# Patient Record
Sex: FEMALE | Race: WHITE | Employment: FULL TIME | URBAN - METROPOLITAN AREA
[De-identification: names, ages, dates, MRNs, and addresses within clinical notes are randomized per-mention and may not be internally consistent; named-entity substitution may affect disease eponyms.]

---

## 2017-10-11 ENCOUNTER — OFFICE VISIT - HEALTHEAST (OUTPATIENT)
Dept: FAMILY MEDICINE | Facility: CLINIC | Age: 52
End: 2017-10-11

## 2017-10-11 DIAGNOSIS — Z12.11 SCREENING FOR COLON CANCER: ICD-10-CM

## 2017-10-11 DIAGNOSIS — Z00.00 ROUTINE GENERAL MEDICAL EXAMINATION AT A HEALTH CARE FACILITY: ICD-10-CM

## 2017-10-11 DIAGNOSIS — M79.89 FINGER SWELLING: ICD-10-CM

## 2017-10-11 DIAGNOSIS — G25.0 ESSENTIAL TREMOR: ICD-10-CM

## 2017-10-11 LAB
CHOLEST SERPL-MCNC: 334 MG/DL
FASTING STATUS PATIENT QL REPORTED: ABNORMAL
HDLC SERPL-MCNC: 89 MG/DL
LDLC SERPL CALC-MCNC: 232 MG/DL
TRIGL SERPL-MCNC: 65 MG/DL

## 2017-10-11 ASSESSMENT — MIFFLIN-ST. JEOR: SCORE: 1061.38

## 2017-10-12 LAB — ANA SER QL: 3.8 U

## 2017-10-17 LAB — DNA (DS) ANTIBODY - HISTORICAL: 1 IU

## 2017-10-20 ENCOUNTER — AMBULATORY - HEALTHEAST (OUTPATIENT)
Dept: FAMILY MEDICINE | Facility: CLINIC | Age: 52
End: 2017-10-20

## 2017-10-24 ENCOUNTER — COMMUNICATION - HEALTHEAST (OUTPATIENT)
Dept: FAMILY MEDICINE | Facility: CLINIC | Age: 52
End: 2017-10-24

## 2017-10-24 DIAGNOSIS — E78.5 HYPERLIPIDEMIA: ICD-10-CM

## 2017-12-20 ENCOUNTER — OFFICE VISIT - HEALTHEAST (OUTPATIENT)
Dept: FAMILY MEDICINE | Facility: CLINIC | Age: 52
End: 2017-12-20

## 2017-12-20 DIAGNOSIS — Z12.4 PAP SMEAR FOR CERVICAL CANCER SCREENING: ICD-10-CM

## 2017-12-20 DIAGNOSIS — E78.00 HIGH CHOLESTEROL: ICD-10-CM

## 2017-12-20 ASSESSMENT — MIFFLIN-ST. JEOR: SCORE: 1061.38

## 2017-12-22 LAB
HUMAN PAPILLOMA VIRUS 16 DNA: NEGATIVE
HUMAN PAPILLOMA VIRUS 18 DNA: NEGATIVE
HUMAN PAPILLOMA VIRUS FINAL DIAGNOSIS: NORMAL
HUMAN PAPILLOMA VIRUS OTHER HR: NEGATIVE
SPECIMEN DESCRIPTION: NORMAL

## 2017-12-26 ENCOUNTER — COMMUNICATION - HEALTHEAST (OUTPATIENT)
Dept: FAMILY MEDICINE | Facility: CLINIC | Age: 52
End: 2017-12-26

## 2017-12-26 LAB
BKR LAB AP ABNORMAL BLEEDING: NO
BKR LAB AP BIRTH CONTROL/HORMONES: NORMAL
BKR LAB AP CERVICAL APPEARANCE: NORMAL
BKR LAB AP GYN ADEQUACY: NORMAL
BKR LAB AP GYN INTERPRETATION: NORMAL
BKR LAB AP HPV REFLEX: NORMAL
BKR LAB AP LMP: NORMAL
BKR LAB AP PATIENT STATUS: NORMAL
BKR LAB AP PREVIOUS ABNORMAL: NORMAL
BKR LAB AP PREVIOUS NORMAL: 2014
HIGH RISK?: NO
PATH REPORT.COMMENTS IMP SPEC: NORMAL
RESULT FLAG (HE HISTORICAL CONVERSION): NORMAL

## 2018-05-08 ENCOUNTER — COMMUNICATION - HEALTHEAST (OUTPATIENT)
Dept: FAMILY MEDICINE | Facility: CLINIC | Age: 53
End: 2018-05-08

## 2018-10-12 ENCOUNTER — AMBULATORY - HEALTHEAST (OUTPATIENT)
Dept: NURSING | Facility: CLINIC | Age: 53
End: 2018-10-12

## 2018-10-12 DIAGNOSIS — Z23 NEED FOR IMMUNIZATION AGAINST INFLUENZA: ICD-10-CM

## 2019-01-15 ENCOUNTER — OFFICE VISIT - HEALTHEAST (OUTPATIENT)
Dept: FAMILY MEDICINE | Facility: CLINIC | Age: 54
End: 2019-01-15

## 2019-01-15 DIAGNOSIS — Z00.00 ROUTINE GENERAL MEDICAL EXAMINATION AT A HEALTH CARE FACILITY: ICD-10-CM

## 2019-01-15 DIAGNOSIS — H61.23 EXCESSIVE CERUMEN IN BOTH EAR CANALS: ICD-10-CM

## 2019-01-15 DIAGNOSIS — D75.89 MACROCYTOSIS: ICD-10-CM

## 2019-01-15 DIAGNOSIS — E78.5 HYPERLIPIDEMIA LDL GOAL <160: ICD-10-CM

## 2019-01-15 DIAGNOSIS — Z12.31 VISIT FOR SCREENING MAMMOGRAM: ICD-10-CM

## 2019-01-15 LAB
BASOPHILS # BLD AUTO: 0 THOU/UL (ref 0–0.2)
BASOPHILS NFR BLD AUTO: 1 % (ref 0–2)
CHOLEST SERPL-MCNC: 292 MG/DL
EOSINOPHIL # BLD AUTO: 0.1 THOU/UL (ref 0–0.4)
EOSINOPHIL NFR BLD AUTO: 2 % (ref 0–6)
ERYTHROCYTE [DISTWIDTH] IN BLOOD BY AUTOMATED COUNT: 11.9 % (ref 11–14.5)
FASTING STATUS PATIENT QL REPORTED: YES
HCT VFR BLD AUTO: 37.4 % (ref 35–47)
HDLC SERPL-MCNC: 105 MG/DL
HGB BLD-MCNC: 12.7 G/DL (ref 12–16)
LDLC SERPL CALC-MCNC: 177 MG/DL
LYMPHOCYTES # BLD AUTO: 1.3 THOU/UL (ref 0.8–4.4)
LYMPHOCYTES NFR BLD AUTO: 27 % (ref 20–40)
MCH RBC QN AUTO: 33.6 PG (ref 27–34)
MCHC RBC AUTO-ENTMCNC: 33.8 G/DL (ref 32–36)
MCV RBC AUTO: 99 FL (ref 80–100)
MONOCYTES # BLD AUTO: 0.6 THOU/UL (ref 0–0.9)
MONOCYTES NFR BLD AUTO: 12 % (ref 2–10)
NEUTROPHILS # BLD AUTO: 2.8 THOU/UL (ref 2–7.7)
NEUTROPHILS NFR BLD AUTO: 59 % (ref 50–70)
PLATELET # BLD AUTO: 252 THOU/UL (ref 140–440)
PMV BLD AUTO: 8.1 FL (ref 7–10)
RBC # BLD AUTO: 3.77 MILL/UL (ref 3.8–5.4)
TRIGL SERPL-MCNC: 48 MG/DL
VIT B12 SERPL-MCNC: >2000 PG/ML (ref 213–816)
WBC: 4.7 THOU/UL (ref 4–11)

## 2019-01-15 RX ORDER — ESTRADIOL 0.1 MG/D
1 PATCH TRANSDERMAL WEEKLY
Qty: 12 PATCH | Refills: 4 | Status: SHIPPED | OUTPATIENT
Start: 2019-01-15 | End: 2021-08-27

## 2019-01-15 RX ORDER — ALENDRONATE SODIUM 70 MG/1
70 TABLET ORAL
Qty: 12 TABLET | Refills: 4 | Status: SHIPPED | OUTPATIENT
Start: 2019-01-15 | End: 2022-05-31 | Stop reason: ALTCHOICE

## 2019-01-15 RX ORDER — OMEGA-3-ACID ETHYL ESTERS 1 G/1
2 CAPSULE, LIQUID FILLED ORAL
Status: SHIPPED | COMMUNITY
Start: 2011-05-20 | End: 2022-12-14

## 2019-01-15 ASSESSMENT — MIFFLIN-ST. JEOR: SCORE: 1055.04

## 2019-01-25 ENCOUNTER — RECORDS - HEALTHEAST (OUTPATIENT)
Dept: ADMINISTRATIVE | Facility: OTHER | Age: 54
End: 2019-01-25

## 2019-02-15 ENCOUNTER — RECORDS - HEALTHEAST (OUTPATIENT)
Dept: ADMINISTRATIVE | Facility: OTHER | Age: 54
End: 2019-02-15

## 2019-02-26 ENCOUNTER — APPOINTMENT (OUTPATIENT)
Dept: NURSING | Facility: CLINIC | Age: 54
End: 2019-02-26
Payer: COMMERCIAL

## 2019-04-28 ENCOUNTER — COMMUNICATION - HEALTHEAST (OUTPATIENT)
Dept: FAMILY MEDICINE | Facility: CLINIC | Age: 54
End: 2019-04-28

## 2019-07-25 ENCOUNTER — RECORDS - HEALTHEAST (OUTPATIENT)
Dept: ADMINISTRATIVE | Facility: OTHER | Age: 54
End: 2019-07-25

## 2019-09-24 ENCOUNTER — COMMUNICATION - HEALTHEAST (OUTPATIENT)
Dept: FAMILY MEDICINE | Facility: CLINIC | Age: 54
End: 2019-09-24

## 2019-10-01 ENCOUNTER — OFFICE VISIT - HEALTHEAST (OUTPATIENT)
Dept: FAMILY MEDICINE | Facility: CLINIC | Age: 54
End: 2019-10-01

## 2019-10-01 DIAGNOSIS — R52 PAIN: ICD-10-CM

## 2019-10-01 DIAGNOSIS — Z23 NEED FOR IMMUNIZATION AGAINST INFLUENZA: ICD-10-CM

## 2019-10-01 RX ORDER — GABAPENTIN 300 MG/1
300-900 CAPSULE ORAL
Status: SHIPPED | COMMUNITY
Start: 2019-09-11 | End: 2021-08-27 | Stop reason: ALTCHOICE

## 2019-10-01 RX ORDER — OMEGA-3-ACID ETHYL ESTERS 1 G/1
2 CAPSULE, LIQUID FILLED ORAL
Status: SHIPPED | COMMUNITY
Start: 2011-05-20 | End: 2022-05-31

## 2019-10-01 RX ORDER — GABAPENTIN 300 MG/1
CAPSULE ORAL
Refills: 0 | Status: SHIPPED | COMMUNITY
Start: 2019-09-11 | End: 2021-08-27 | Stop reason: ALTCHOICE

## 2019-10-01 ASSESSMENT — PATIENT HEALTH QUESTIONNAIRE - PHQ9: SUM OF ALL RESPONSES TO PHQ QUESTIONS 1-9: 2

## 2019-10-01 ASSESSMENT — MIFFLIN-ST. JEOR: SCORE: 1064.21

## 2019-10-02 ENCOUNTER — COMMUNICATION - HEALTHEAST (OUTPATIENT)
Dept: FAMILY MEDICINE | Facility: CLINIC | Age: 54
End: 2019-10-02

## 2019-10-02 DIAGNOSIS — R52 PAIN: ICD-10-CM

## 2019-10-02 DIAGNOSIS — F32.4 MAJOR DEPRESSIVE DISORDER WITH SINGLE EPISODE, IN PARTIAL REMISSION (H): ICD-10-CM

## 2019-10-02 DIAGNOSIS — M85.80 OSTEOPENIA, UNSPECIFIED LOCATION: ICD-10-CM

## 2019-10-02 DIAGNOSIS — Z78.0 MENOPAUSE: ICD-10-CM

## 2019-10-14 ENCOUNTER — COMMUNICATION - HEALTHEAST (OUTPATIENT)
Dept: FAMILY MEDICINE | Facility: CLINIC | Age: 54
End: 2019-10-14

## 2019-10-14 DIAGNOSIS — Z00.00 ROUTINE HISTORY AND PHYSICAL EXAMINATION OF ADULT: ICD-10-CM

## 2019-10-27 ENCOUNTER — COMMUNICATION - HEALTHEAST (OUTPATIENT)
Dept: FAMILY MEDICINE | Facility: CLINIC | Age: 54
End: 2019-10-27

## 2019-10-27 DIAGNOSIS — M54.2 NECK PAIN: ICD-10-CM

## 2019-10-27 DIAGNOSIS — M47.812 ARTHROPATHY OF CERVICAL FACET JOINT: ICD-10-CM

## 2019-10-28 ENCOUNTER — COMMUNICATION - HEALTHEAST (OUTPATIENT)
Dept: FAMILY MEDICINE | Facility: CLINIC | Age: 54
End: 2019-10-28

## 2019-10-28 DIAGNOSIS — Z00.00 ROUTINE ADULT HEALTH MAINTENANCE: ICD-10-CM

## 2019-10-28 DIAGNOSIS — Z23 NEED FOR DIPHTHERIA, TETANUS, ACELLULAR PERTUSSIS, HAEMOPHILUS INFLUENZAE, AND HEPATITIS B VIRUS VACCINE: ICD-10-CM

## 2019-11-12 ENCOUNTER — COMMUNICATION - HEALTHEAST (OUTPATIENT)
Dept: FAMILY MEDICINE | Facility: CLINIC | Age: 54
End: 2019-11-12

## 2019-11-12 DIAGNOSIS — M54.2 NECK PAIN: ICD-10-CM

## 2019-11-12 DIAGNOSIS — M47.812 ARTHROPATHY OF CERVICAL FACET JOINT: ICD-10-CM

## 2019-11-19 ENCOUNTER — RECORDS - HEALTHEAST (OUTPATIENT)
Dept: ADMINISTRATIVE | Facility: OTHER | Age: 54
End: 2019-11-19

## 2019-11-20 ENCOUNTER — RECORDS - HEALTHEAST (OUTPATIENT)
Dept: ADMINISTRATIVE | Facility: OTHER | Age: 54
End: 2019-11-20

## 2019-11-25 ENCOUNTER — RECORDS - HEALTHEAST (OUTPATIENT)
Dept: ADMINISTRATIVE | Facility: OTHER | Age: 54
End: 2019-11-25

## 2019-12-10 ENCOUNTER — COMMUNICATION - HEALTHEAST (OUTPATIENT)
Dept: FAMILY MEDICINE | Facility: CLINIC | Age: 54
End: 2019-12-10

## 2019-12-10 DIAGNOSIS — M47.812 ARTHROPATHY OF CERVICAL FACET JOINT: ICD-10-CM

## 2019-12-10 DIAGNOSIS — M54.9 PAIN, UPPER BACK: ICD-10-CM

## 2019-12-10 DIAGNOSIS — M54.2 NECK PAIN: ICD-10-CM

## 2020-01-19 ENCOUNTER — RECORDS - HEALTHEAST (OUTPATIENT)
Dept: ADMINISTRATIVE | Facility: OTHER | Age: 55
End: 2020-01-19

## 2020-01-29 ENCOUNTER — COMMUNICATION - HEALTHEAST (OUTPATIENT)
Dept: FAMILY MEDICINE | Facility: CLINIC | Age: 55
End: 2020-01-29

## 2020-01-31 ENCOUNTER — OFFICE VISIT - HEALTHEAST (OUTPATIENT)
Dept: FAMILY MEDICINE | Facility: CLINIC | Age: 55
End: 2020-01-31

## 2020-01-31 ENCOUNTER — COMMUNICATION - HEALTHEAST (OUTPATIENT)
Dept: FAMILY MEDICINE | Facility: CLINIC | Age: 55
End: 2020-01-31

## 2020-01-31 DIAGNOSIS — E78.5 HYPERLIPIDEMIA LDL GOAL <130: ICD-10-CM

## 2020-01-31 DIAGNOSIS — E55.9 VITAMIN D DEFICIENCY DISEASE: ICD-10-CM

## 2020-01-31 DIAGNOSIS — M85.80 OSTEOPENIA, UNSPECIFIED LOCATION: ICD-10-CM

## 2020-01-31 DIAGNOSIS — Z78.0 MENOPAUSE: ICD-10-CM

## 2020-01-31 DIAGNOSIS — F32.2 CURRENT SEVERE EPISODE OF MAJOR DEPRESSIVE DISORDER WITHOUT PSYCHOTIC FEATURES WITHOUT PRIOR EPISODE (H): ICD-10-CM

## 2020-01-31 DIAGNOSIS — Z00.00 ROUTINE HISTORY AND PHYSICAL EXAMINATION OF ADULT: ICD-10-CM

## 2020-01-31 DIAGNOSIS — F41.1 GENERALIZED ANXIETY DISORDER: ICD-10-CM

## 2020-01-31 DIAGNOSIS — M54.2 NECK PAIN: ICD-10-CM

## 2020-01-31 LAB
CHOLEST SERPL-MCNC: 399 MG/DL
FASTING STATUS PATIENT QL REPORTED: NO
HDLC SERPL-MCNC: 102 MG/DL
LDLC SERPL CALC-MCNC: 284 MG/DL
TRIGL SERPL-MCNC: 66 MG/DL

## 2020-01-31 RX ORDER — BUSPIRONE HYDROCHLORIDE 5 MG/1
5 TABLET ORAL 3 TIMES DAILY
Qty: 90 TABLET | Refills: 2 | Status: SHIPPED | OUTPATIENT
Start: 2020-01-31 | End: 2021-10-12

## 2020-01-31 RX ORDER — ESTRADIOL 1 MG/1
1 TABLET ORAL DAILY
Qty: 90 TABLET | Refills: 6 | Status: SHIPPED | OUTPATIENT
Start: 2020-01-31 | End: 2021-08-27

## 2020-01-31 ASSESSMENT — MIFFLIN-ST. JEOR: SCORE: 1054.76

## 2020-02-02 ENCOUNTER — COMMUNICATION - HEALTHEAST (OUTPATIENT)
Dept: FAMILY MEDICINE | Facility: CLINIC | Age: 55
End: 2020-02-02

## 2020-02-03 LAB
25(OH)D3 SERPL-MCNC: 36.7 NG/ML (ref 30–80)
25(OH)D3 SERPL-MCNC: 36.7 NG/ML (ref 30–80)

## 2020-03-10 ENCOUNTER — COMMUNICATION - HEALTHEAST (OUTPATIENT)
Dept: FAMILY MEDICINE | Facility: CLINIC | Age: 55
End: 2020-03-10

## 2020-03-30 ENCOUNTER — COMMUNICATION - HEALTHEAST (OUTPATIENT)
Dept: ADMINISTRATIVE | Facility: CLINIC | Age: 55
End: 2020-03-30

## 2020-12-11 ENCOUNTER — COMMUNICATION - HEALTHEAST (OUTPATIENT)
Dept: FAMILY MEDICINE | Facility: CLINIC | Age: 55
End: 2020-12-11

## 2020-12-11 DIAGNOSIS — E78.5 HYPERLIPIDEMIA LDL GOAL <130: ICD-10-CM

## 2020-12-14 ENCOUNTER — COMMUNICATION - HEALTHEAST (OUTPATIENT)
Dept: FAMILY MEDICINE | Facility: CLINIC | Age: 55
End: 2020-12-14

## 2020-12-15 ENCOUNTER — COMMUNICATION - HEALTHEAST (OUTPATIENT)
Dept: FAMILY MEDICINE | Facility: CLINIC | Age: 55
End: 2020-12-15

## 2020-12-15 ENCOUNTER — AMBULATORY - HEALTHEAST (OUTPATIENT)
Dept: LAB | Facility: CLINIC | Age: 55
End: 2020-12-15

## 2020-12-15 DIAGNOSIS — E78.5 HYPERLIPIDEMIA LDL GOAL <130: ICD-10-CM

## 2020-12-15 LAB
CHOLEST SERPL-MCNC: 381 MG/DL
FASTING STATUS PATIENT QL REPORTED: YES
HDLC SERPL-MCNC: 99 MG/DL
LDLC SERPL CALC-MCNC: 264 MG/DL
TRIGL SERPL-MCNC: 90 MG/DL

## 2021-03-24 ENCOUNTER — AMBULATORY - HEALTHEAST (OUTPATIENT)
Dept: FAMILY MEDICINE | Facility: CLINIC | Age: 56
End: 2021-03-24

## 2021-03-24 ENCOUNTER — MEDICAL CORRESPONDENCE (OUTPATIENT)
Dept: HEALTH INFORMATION MANAGEMENT | Facility: CLINIC | Age: 56
End: 2021-03-24

## 2021-03-24 ENCOUNTER — COMMUNICATION - HEALTHEAST (OUTPATIENT)
Dept: FAMILY MEDICINE | Facility: CLINIC | Age: 56
End: 2021-03-24

## 2021-03-24 DIAGNOSIS — E78.5 HYPERLIPIDEMIA LDL GOAL <130: ICD-10-CM

## 2021-03-26 DIAGNOSIS — E78.5 HYPERLIPIDEMIA LDL GOAL <130: Primary | ICD-10-CM

## 2021-04-24 DIAGNOSIS — E78.5 HYPERLIPIDEMIA LDL GOAL <100: ICD-10-CM

## 2021-04-24 LAB
CHOLEST SERPL-MCNC: 320 MG/DL
HDLC SERPL-MCNC: 128 MG/DL
LDLC SERPL CALC-MCNC: 181 MG/DL
NONHDLC SERPL-MCNC: 192 MG/DL
TRIGL SERPL-MCNC: 54 MG/DL

## 2021-04-24 PROCEDURE — 36415 COLL VENOUS BLD VENIPUNCTURE: CPT | Performed by: FAMILY MEDICINE

## 2021-04-24 PROCEDURE — 80061 LIPID PANEL: CPT | Performed by: FAMILY MEDICINE

## 2021-04-25 ENCOUNTER — COMMUNICATION - HEALTHEAST (OUTPATIENT)
Dept: FAMILY MEDICINE | Facility: CLINIC | Age: 56
End: 2021-04-25

## 2021-04-25 DIAGNOSIS — E78.5 HYPERLIPIDEMIA LDL GOAL <130: ICD-10-CM

## 2021-05-05 ENCOUNTER — COMMUNICATION - HEALTHEAST (OUTPATIENT)
Dept: FAMILY MEDICINE | Facility: CLINIC | Age: 56
End: 2021-05-05

## 2021-05-05 DIAGNOSIS — Z13.220 SCREENING FOR HYPERLIPIDEMIA: ICD-10-CM

## 2021-05-05 DIAGNOSIS — E78.5 HYPERLIPIDEMIA LDL GOAL <130: ICD-10-CM

## 2021-05-16 ENCOUNTER — HEALTH MAINTENANCE LETTER (OUTPATIENT)
Age: 56
End: 2021-05-16

## 2021-05-26 ASSESSMENT — PATIENT HEALTH QUESTIONNAIRE - PHQ9: SUM OF ALL RESPONSES TO PHQ QUESTIONS 1-9: 2

## 2021-05-31 VITALS — BODY MASS INDEX: 18.27 KG/M2 | HEIGHT: 64 IN | WEIGHT: 107 LBS

## 2021-05-31 VITALS — HEIGHT: 64 IN | BODY MASS INDEX: 18.27 KG/M2 | WEIGHT: 107 LBS

## 2021-06-01 NOTE — PROGRESS NOTES
OFFICE VISIT NOTE      Assessment & Plan   Lydia Alonzo is a 54 y.o. female with chronic neck pain likely due to her work positioning and a cervical facet hypertrophy.  She has already done a lot of PT formally and on her own, plus other evaluations. She wants to try a medication besides gabapentin to help this, and has chosen Savella. She'll do the intro dosing and remain in touch on the effect.  If this does not work, she might try Lyrica. She has tried AM dosing of gabapentin but found it too sedating. She took 300mg of gabapentin, however, and I think she could try 100mg AM and noon. Will follow.    Diagnoses and all orders for this visit:    Need for immunization against influenza  -     Influenza, Seasonal Quad, PF =/> 6months    Pain  -     milnacipran (SAVELLA) 12.5 mg (5)-25 mg(8)-50 mg(42) DsPk; Take as directed  Dispense: 45 each; Refill: 0        Ritika Torres MD    The following low BMI interventions were performed this visit: weight gain advised          Subjective:   Chief Complaint:  Follow-up (Neck Pain)    54 y.o. female.     1) neck pain has been pretty bad x 9 months. She's had evaluation by PT and others - she was found to have a cervical facet hypertrophy. She's been working at managing this pain and is quite frustrated by it. Her work demands 12 hr days and she simply cannot tolerate that. She has an ergonomic set up and stretches morning and night. She currently takes gabapentin 900mg at bedtime which helps her sleep. While she was told to take a morning dose and she tried it, she found it too sedating to maintain with work.    She's struggling with how much this is affecting her. She's not taken a week off work in a very long time - but she took the past week off, hoping to help her neck pain.    Current Outpatient Medications   Medication Sig     alendronate (FOSAMAX) 70 MG tablet Take 1 tablet (70 mg total) by mouth every 7 days.     estradiol (ESTRACE) 1 MG tablet      gabapentin  "(NEURONTIN) 300 MG capsule Take 300-900 mg by mouth.     gabapentin (NEURONTIN) 300 MG capsule      omega-3 acid ethyl esters (LOVAZA) 1 gram capsule Take 2 tablets by mouth.     omega-3 acid ethyl esters (LOVAZA) 1 gram capsule Take 2 tablets by mouth.     progesterone (PROMETRIUM) 100 MG capsule Take 1 capsule (100 mg total) by mouth daily.     progesterone (PROMETRIUM) 100 MG capsule Take 100 mg by mouth.     sulfacetamide-prednisoLONE (VASOCIDIN) 10 %-0.23 % (0.25 %) ophthalmic solution      estradiol (CLIMARA) 0.1 mg/24 hr Place 1 patch on the skin once a week.     ESTRADIOL TRANSDERMAL PATCH TD Place on the skin.     milnacipran (SAVELLA) 12.5 mg (5)-25 mg(8)-50 mg(42) DsPk Take as directed       PSFHx: appropriate sections of PMH completed/filled in   Tobacco Status:  She  reports that she has never smoked. She has never used smokeless tobacco.    Review of Systems:  No fever.  No rash. All other systems negative except as noted above.    Objective:    BP 90/56 (Patient Site: Left Arm, Patient Position: Sitting, Cuff Size: Adult Regular)   Pulse 60   Temp 98  F (36.7  C) (Oral)   Resp 16   Ht 5' 4\" (1.626 m)   Wt 106 lb 12 oz (48.4 kg)   BMI 18.32 kg/m    GENERAL: No acute distress, thin, frustrated  Neck: appears symmetric, no bruising, erythema, with palpation the worst pain is lateral to about C7 and the muscle tension is strong there; the rest of the upper trapezius has normal muscle tone; she has a definite head forward position/posture, which is not reversible.    Spent 40 min face to face with patient with more the 50% spent in counseling, education and coordination of care and discussing pain management, neck pain, attitude/frustration/mood.    "

## 2021-06-01 NOTE — TELEPHONE ENCOUNTER
Medication Request  Medication name:   estradiol (ESTRACE) 1 MG tablet  2 8/22/2019     Class: Historical Med      Pharmacy Name and Location:   [facility]  [address] [place] [address]  [phone]  Reason for request:   Patient request for Historical Medication  When did you use medication last?:    Unknown  Patient offered appointment:  No, patient saw Provider on 10/1/19  Okay to leave a detailed message: yes

## 2021-06-01 NOTE — TELEPHONE ENCOUNTER
Called [facility] pharmacy about Savella ordered yesterday, however, pharmacy is closed until 10 am.  Will call back. Thanks.

## 2021-06-01 NOTE — TELEPHONE ENCOUNTER
Medication Question or Clarification  Who is calling: Patient  What medication are you calling about? (include dose and sig)   milnacipran (SAVELLA) 12.5 mg (5)-25 mg(8)-50 mg(42) DsPk 45 each 0 10/1/2019     Sig: Take as directed    Sent to pharmacy as: milnacipran 12.5 mg (5)-25 mg(8)-50mg(42) tablets in a dose pack (SAVELLA)    E-Prescribing Status: Receipt confirmed by pharmacy (10/1/2019  8:00 AM CDT)      progesterone (PROMETRIUM) 100 MG capsule 90 capsule 4 1/15/2019     Sig - Route: Take 1 capsule (100 mg total) by mouth daily. - Oral    Sent to pharmacy as: progesterone (PROMETRIUM) 100 MG capsule    E-Prescribing Status: Receipt confirmed by pharmacy (1/15/2019  8:11 AM CST)        Who prescribed the medication?:   Ritika Torres MD  What is your question/concern?:   Patient's insurance does not cover above prescriptions at [facility].  Please send to new Pharmacy.  Pharmacy:   [facility]  [address]   [phone]  Okay to leave a detailed message?: Yes  Site CMT - Please call the pharmacy to obtain any additional needed information.

## 2021-06-02 VITALS — HEIGHT: 64 IN | BODY MASS INDEX: 17.78 KG/M2 | WEIGHT: 104.12 LBS

## 2021-06-02 NOTE — TELEPHONE ENCOUNTER
Please call [facility] in [place] ([phone]). This patient wishes to get her Hep A vaccine through them. I initially ordered it to be given at [facility], but now she clarified about getting it at that pharmacy. So, Monday evening I attempted to order it to the pharmacy. I want to be sure #1 they can give the Hep A shot and #2 they received the order. If they didn't, ask them the best way for me to order the shot (both the initial shot and the 2nd one in 6 months).

## 2021-06-02 NOTE — TELEPHONE ENCOUNTER
KAREN called the pharmacy in [place].     [facility] will print order for Hep A vaccine as ordered by PCP and faxed to [facility] at [phone] as requested.     Patient updated/notified. Thanks.

## 2021-06-02 NOTE — TELEPHONE ENCOUNTER
Assessment & Plan   Lydia Alonzo is a 54 y.o. female with chronic neck pain likely due to her work positioning and a cervical facet hypertrophy.  She has already done a lot of PT formally and on her own, plus other evaluations. She wants to try a medication besides gabapentin to help this, and has chosen Savella. She'll do the intro dosing and remain in touch on the effect.  If this does not work, she might try Lyrica. She has tried AM dosing of gabapentin but found it too sedating. She took 300mg of gabapentin, however, and I think she could try 100mg AM and noon. Will follow.     Diagnoses and all orders for this visit:     Need for immunization against influenza  -     Influenza, Seasonal Quad, PF =/> 6months     Pain  -     milnacipran (SAVELLA) 12.5 mg (5)-25 mg(8)-50 mg(42) DsPk; Take as directed  Dispense: 45 each; Refill: 0

## 2021-06-03 VITALS
TEMPERATURE: 98 F | WEIGHT: 106.75 LBS | DIASTOLIC BLOOD PRESSURE: 56 MMHG | HEIGHT: 64 IN | RESPIRATION RATE: 16 BRPM | HEART RATE: 60 BPM | BODY MASS INDEX: 18.22 KG/M2 | SYSTOLIC BLOOD PRESSURE: 90 MMHG

## 2021-06-04 VITALS
DIASTOLIC BLOOD PRESSURE: 62 MMHG | TEMPERATURE: 97.7 F | OXYGEN SATURATION: 99 % | HEIGHT: 64 IN | BODY MASS INDEX: 17.88 KG/M2 | HEART RATE: 57 BPM | SYSTOLIC BLOOD PRESSURE: 98 MMHG | WEIGHT: 104.75 LBS | RESPIRATION RATE: 18 BRPM

## 2021-06-05 NOTE — PROGRESS NOTES
Assessment:      Healthy female exam.    Hyperlipidemia - check today to see where it is trending  Vit d - check level due to osteopenia and emotional stress  Underweight - she might explore this more with a body composition analysis; to find out if she's overfat or underlean.  Neck pain - being managed by PM&R; she might explore massage and acupuncture.     Plan:       Discussed healthy lifestyle modifications.  discussed stress management and acceptance of troubles/problems/needs             Subjective:      Lydia Alonzo is a 54 y.o. female who presents for an annual exam. The patient is not sexually active. The patient participates in regular exercise: yes. The patient reports that there is not domestic violence in her life. She is struggling mood-wise given her neck pain problem and recent over-dose with botox which she has to wait to resolve. In addition, she had a recent exposure to tissue while making biopsy cuts (pathology specimen exposure) - this was deemed to be a low-risk exposure and she has taken no medication for it. Still, because of this, she prefers to have a tetanus update.    She admits she struggles to accept that she's having troubles. She feels like pain and problems are OK for others but not for her.  She's off work this week, because when it gets to about 11am, she has terrible neck pain and she has to lie down. She listens to books on tape. Her mom notes that Ignacia was crying about talking about this, prior to coming into the clinic.    Healthy Habits:   Regular Exercise: Yes  Sunscreen Use: Yes  Healthy Diet: Yes  Dental Visits Regularly: Yes  Seat Belt: Yes  Sexually active: No  Self Breast Exam Monthly:Yes  Hemoccults: N/A  Flex Sig: N/A  Colonoscopy: N/A  Lipid Profile: Yes  Glucose Screen: N/A  Prevention of Osteoporosis: N/A  Last Dexa: N/A  Guns at Home:  No      Immunization History   Administered Date(s) Administered     DTP 1965, 01/18/1966, 08/03/1970     DTaP, historic  1965, 1966, 1970     Hepatitis B: Immune 10/02/1999     Influenza, inj, historic,unspecified 10/02/2014, 10/07/2016     Influenza,seasonal quad, PF, 6-35MOS 10/08/2013     Influenza,seasonal quad, PF, =/> 6months 10/08/2013, 10/07/2016, 10/03/2017, 10/01/2019     Influenza,seasonal,quad inj =/> 6months 10/12/2018     MMR 1978     Measles 1966     Measles / Rubella 1978     Mumps 1968     OPV,Trivalent,Historic(1348-7344 only) 08/15/1968, 10/22/1968, 1968, 1978     Rubella 1969     Td, Adult, Absorbed 1970, 1974, 1977, 1982     Td, adult adsorbed, PF 2020     Td,adult,historic,unspecified 1970, 1974, 1977, 1982     Tdap 2013     ZOSTER, RECOMBINANT, IM 2019, 2019     Immunization status: up to date and documented.    No exam data present    Gynecologic History  No LMP recorded (lmp unknown). Patient is perimenopausal.  Contraception: none  Last Pap: 2017. Results were: normal  Last mammogram: . Results were: normal       OB History    Para Term  AB Living   0 0 0 0 0 0   SAB TAB Ectopic Multiple Live Births   0 0 0 0 0       Current Outpatient Medications   Medication Sig Dispense Refill     alendronate (FOSAMAX) 70 MG tablet Take 1 tablet (70 mg total) by mouth every 7 days. 12 tablet 4     busPIRone (BUSPAR) 5 MG tablet Take 1 tablet (5 mg total) by mouth 3 (three) times a day. 90 tablet 2     estradiol (CLIMARA) 0.1 mg/24 hr Place 1 patch on the skin once a week. 12 patch 4     estradiol (ESTRACE) 1 MG tablet Take 1 tablet (1 mg total) by mouth daily. 90 tablet 6     gabapentin (NEURONTIN) 300 MG capsule Take 300-900 mg by mouth.       gabapentin (NEURONTIN) 300 MG capsule   0     hepatitis A vaccine (VAQTA) 50 unit/mL injection Inject 1 mL (50 Units total) into the shoulder, thigh, or buttocks every 6 (six) months for 2 doses. 2 mL 0     milnacipran (SAVELLA)  12.5 mg (5)-25 mg(8)-50 mg(42) DsPk Take as directed 45 each 0     milnacipran (SAVELLA) 12.5 mg (5)-25 mg(8)-50 mg(42) DsPk Take as directed 45 each 0     omega-3 acid ethyl esters (LOVAZA) 1 gram capsule Take 2 tablets by mouth.       omega-3 acid ethyl esters (LOVAZA) 1 gram capsule Take 2 tablets by mouth.       pregabalin (LYRICA) 100 MG capsule 100mg PO tid 270 capsule 4     progesterone (PROMETRIUM) 100 MG capsule Take 100 mg by mouth.       progesterone (PROMETRIUM) 100 MG capsule Take 1 capsule (100 mg total) by mouth daily. 90 capsule 4     sulfacetamide-prednisoLONE (VASOCIDIN) 10 %-0.23 % (0.25 %) ophthalmic solution   2     No current facility-administered medications for this visit.      Past Medical History:   Diagnosis Date     Essential tremor      History of colonoscopy 11/2019    needs double prep to be adequately prepared     Osteopenia     taking alendronate, calcium and vit D + lifts weights     Pap smear for cervical cancer screening 2017    neg pap and no high risk HPV; next due 2022     Past Surgical History:   Procedure Laterality Date     NO PAST SURGERIES       Neomycin-bacitracnzn-polymyxnb  Family History   Problem Relation Age of Onset     No Medical Problems Brother      Social History     Socioeconomic History     Marital status: Single     Spouse name: Not on file     Number of children: 0     Years of education: 20+     Highest education level: Not on file   Occupational History     Not on file   Social Needs     Financial resource strain: Not on file     Food insecurity:     Worry: Not on file     Inability: Not on file     Transportation needs:     Medical: Not on file     Non-medical: Not on file   Tobacco Use     Smoking status: Never Smoker     Smokeless tobacco: Never Used   Substance and Sexual Activity     Alcohol use: No     Drug use: No     Sexual activity: Never     Birth control/protection: None   Lifestyle     Physical activity:     Days per week: Not on file      "Minutes per session: Not on file     Stress: Not on file   Relationships     Social connections:     Talks on phone: Not on file     Gets together: Not on file     Attends Pentecostal service: Not on file     Active member of club or organization: Not on file     Attends meetings of clubs or organizations: Not on file     Relationship status: Not on file     Intimate partner violence:     Fear of current or ex partner: Not on file     Emotionally abused: Not on file     Physically abused: Not on file     Forced sexual activity: Not on file   Other Topics Concern     Not on file   Social History Narrative            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    Physical Exam     General Appearance: Alert, cooperative, some emotional distress, appears stated age  Head: Normocephalic, without obvious abnormality, atraumatic  Eyes: PERRL, conjunctiva/corneas clear, EOM's intact  Ears: TM's clear and retracted,external ear canals with mild amount of cerumen, both ears  Nose: Nares clear, septum midline,mucosa pink and moist, no drainage  Throat: Lips, mucosa, and tongue moist without lesions; teeth clean  Neck: Supple, symmetrical, trachea midline, no adenopathy;  thyroid: not enlarged, symmetric, no tenderness/mass/nodules; I do not feel muscle knots or tension in the posterior, paracervical muscles  Back: Symmetric, no curvature, ROM normal, no CVA tenderness  Lungs: Clear to auscultation " bilaterally, respirations unlabored  Heart: Regular rate and rhythm, S1 and S2 normal, no murmur  Abdomen: Soft, non-tender, bowel sounds active,  no masses, no organomegaly  Extremities: Extremities have symmetric bulk and tone, atraumatic, no cyanosis or edema  Skin: no rashes or lesions, warm  Neurologic: smooth coordination during exam, +2/4 DTRs in patellar tendons      Lipid and vit D pending    I spent 40min on the routine adult health maint; and an additional 15min on neck pain, emotional stress, lipids, etc.

## 2021-06-07 ENCOUNTER — AMBULATORY - HEALTHEAST (OUTPATIENT)
Dept: LAB | Facility: HOSPITAL | Age: 56
End: 2021-06-07

## 2021-06-07 DIAGNOSIS — E78.5 HYPERLIPIDEMIA LDL GOAL <130: ICD-10-CM

## 2021-06-07 LAB
CHOLEST SERPL-MCNC: 254 MG/DL
FASTING STATUS PATIENT QL REPORTED: YES
HDLC SERPL-MCNC: 110 MG/DL
LDLC SERPL CALC-MCNC: 135 MG/DL
TRIGL SERPL-MCNC: 44 MG/DL

## 2021-06-09 ENCOUNTER — OFFICE VISIT - HEALTHEAST (OUTPATIENT)
Dept: FAMILY MEDICINE | Facility: CLINIC | Age: 56
End: 2021-06-09

## 2021-06-09 DIAGNOSIS — M54.9 PAIN, UPPER BACK: ICD-10-CM

## 2021-06-09 DIAGNOSIS — M54.2 NECK PAIN: ICD-10-CM

## 2021-06-09 DIAGNOSIS — E78.5 HYPERLIPIDEMIA LDL GOAL <130: ICD-10-CM

## 2021-06-09 DIAGNOSIS — M47.812 ARTHROPATHY OF CERVICAL FACET JOINT: ICD-10-CM

## 2021-06-09 RX ORDER — ROSUVASTATIN CALCIUM 10 MG/1
10 TABLET, COATED ORAL AT BEDTIME
Qty: 90 TABLET | Refills: 4 | Status: SHIPPED | OUTPATIENT
Start: 2021-06-09 | End: 2022-05-31

## 2021-06-09 ASSESSMENT — MIFFLIN-ST. JEOR: SCORE: 1038.14

## 2021-06-11 ENCOUNTER — RECORDS - HEALTHEAST (OUTPATIENT)
Dept: FAMILY MEDICINE | Facility: CLINIC | Age: 56
End: 2021-06-11

## 2021-06-11 DIAGNOSIS — M54.2 NECK PAIN: ICD-10-CM

## 2021-06-13 NOTE — PROGRESS NOTES
Assessment:      Healthy female exam.    Intro visit - refilled her usual medications, referred her to rheumatology (for work up) and [place] (colonoscopy).  She will pursue a mammogram.  She will return for a pap and follow up when the above are accomplished     Plan:       establish care  Blood tests: CBC with diff, Comprehensive metabolic panel, Total cholesterol and rheumatoid labs.  Discussed healthy lifestyle modifications.  Mammogram.  Pap smear.            Subjective:      Lydia Alonzo is a 52 y.o. female who presents to [facility]. She has occasional finger swelling which she is worried about - for a rheumatological problem. It comes and goes on it's own. She'd like to see a rheumatologist about this.  She has not been getting regular health care for a couple years. She knows she needs a colonoscopy and a mammogram. She'll set these up. She agrees to do a pap smear sometime in the future.  The patient is not currently sexually active. The patient participates in regular exercise: yes. The patient reports that there is not domestic violence in her life.    Healthy Habits:   Regular Exercise: Yes  Sunscreen Use: Yes  Healthy Diet: Yes  Dental Visits Regularly: Yes  Seat Belt: Yes  Sexually active: not currently  Self Breast Exam Monthly:Yes  Hemoccults: No  Flex Sig: No  Colonoscopy: No  Lipid Profile: unknown  Glucose Screen: unknown  Prevention of Osteoporosis: Yes  Last Dexa: Yes  Guns at Home:  No    There is no immunization history on file for this patient.      No exam data present    Gynecologic History  Patient's last menstrual period was 10/01/2017 (approximate).  Contraception: abstinence  Last Pap: not asked, likely more than 5 years ago. Results were: normal  Last mammogram: never. Results were: n/a      OB History   No data available       Current Outpatient Prescriptions   Medication Sig Dispense Refill     alendronate (FOSAMAX) 70 MG tablet   2     estradiol (CLIMARA) 0.1 mg/24 hr   2      "progesterone (PROMETRIUM) 100 MG capsule   2     No current facility-administered medications for this visit.      Past Medical History:   Diagnosis Date     Essential tremor      Osteopenia     taking alendronate, calcium and vit D + lifts weights     History reviewed. No pertinent surgical history.  Review of patient's allergies indicates no known allergies.  Family History   Problem Relation Age of Onset     No Medical Problems Brother      Social History     Social History     Marital status: Single     Spouse name: N/A     Number of children: 0     Years of education: 20+     Occupational History     Not on file.     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    Physical  Physical Exam     No exam done    Labs pending  Referrals written for    Greater than 45 minutes spent with patient, with greater than 50% of time spent in counseling, consultation and care coordination regarding problems detailed above.      "

## 2021-06-13 NOTE — TELEPHONE ENCOUNTER
----- Message from Ritika Mirza MD sent at 12/11/2020  6:43 PM CST -----  Regarding: lab-only appt Dec 15, at 7 or 7:30am?  Please book Ignacia for a lab-only appt on Dec 15th, preferably at 7 or 7:30am but as late as 8am is OK.  Thanks  Carol Torres

## 2021-06-14 NOTE — PROGRESS NOTES
"OFFICE VISIT NOTE      Assessment & Plan   Lydia Alonzo is a 52 y.o. female here for her pap and follow up.  She went to [facility] - will be watched for development of a rheumatology problem  OK to do shingles vaccine (new one) when available.  High cholesterol - at this time, she'll take a baby ASA daily and we'll keep monitoring this and following research      Diagnoses and all orders for this visit:    Pap smear for cervical cancer screening  -     Gynecologic Cytology (PAP Smear)  -     Wet Prep, Vaginal        Ritika Torres MD    The following low BMI interventions were performed this visit: weight gain advised and nutrition/feeding management          Subjective:   Chief Complaint:  Pap and shingles vaccine (Wants to receive new vaccine when available)    52 y.o. female.     1) agrees to do pap - no discharge or other problems, however she is just at the end of a surprise menses    2) went to [facility] - info should be sent here  3) discussed cholesterol management with [facility] - was recommended to only take baby ASA daily at this point    Current Outpatient Prescriptions   Medication Sig Note     estradiol (CLIMARA) 0.1 mg/24 hr Place 1 patch on the skin once a week.  10/11/2017: Received from: External Pharmacy     progesterone (PROMETRIUM) 100 MG capsule Take 100 mg by mouth daily.  10/11/2017: Received from: External Pharmacy     alendronate (FOSAMAX) 70 MG tablet Take 70 mg by mouth every 7 days.  10/11/2017: Received from: External Pharmacy       PSFHx: appropriate sections of PMH completed/filled in   Tobacco Status:  She  reports that she has never smoked. She has never used smokeless tobacco.    Review of Systems:  No fever.  No cough. No rash.    Objective:    /56 (Cuff Size: Adult Regular)  Pulse 60  Ht 5' 3.75\" (1.619 m)  Wt 107 lb (48.5 kg)  SpO2 99%  BMI 18.51 kg/m2  GENERAL: No acute distress.  : external genitalia appear without erythema, masses or lesions; Sanjuanita speculum used, " vaginal mucosa is moist and pink; a small amount of old blood wiped away from the posterior forchette; cervix is pink and smooth in appearance, SCJ completely inside the os; pap taken and did not cause any bleeding; bimanual exam reveals un-palpable ovaries  Rectal: appearance externally is pink and clean, no erythema or mass seen    Greater than 25 minutes spent with patient, with greater than 50% of time spent in counseling, consultation and care coordination regarding problems detailed above.

## 2021-06-16 PROBLEM — F32.A DEPRESSION: Status: ACTIVE | Noted: 2019-01-15

## 2021-06-16 PROBLEM — E78.5 HYPERLIPIDEMIA LDL GOAL <130: Status: ACTIVE | Noted: 2017-11-15

## 2021-06-16 PROBLEM — F41.9 ANXIETY DISORDER: Status: ACTIVE | Noted: 2019-01-15

## 2021-06-16 NOTE — TELEPHONE ENCOUNTER
Reason for Call:  Other returning call      Detailed comments: The patient contacts the office to give an updated phone number for the preferred clinic. Patient states that the correct fax number is [phone]. Writer informed the patient that I would resend the lab order to this new fax per her request.     Phone Number Patient can be reached at: Cell number on file:    Telephone Information:   Mobile [phone]       Best Time: ASAP     Can we leave a detailed message on this number?: No call back needed    Call taken on 3/26/2021 at 11:18 AM by Bladimir Waters

## 2021-06-20 NOTE — LETTER
Letter by Ritika Torres MD at      Author: Ritika Torres MD Service: -- Author Type: --    Filed:  Encounter Date: 3/30/2020 Status: (Other)        [facility] PATIENT ACCESS  [address]  [facility] [id number] [phone]         Lydia Alonzo  [address]  [place] [address]        03/30/20    Dear Lydia Alonzo,     At [facility] we care about your health and well-being. Your primary care provider is committed to ensuring you receive high quality care and has chosen a network of specialists to assist in providing that care. Recently Dr. Torres referred you to Acupuncture for specialty care.      Please call [facility] at [phone] at your earliest convenience for assistance in scheduling an appointment.  If you have already scheduled this appointment, please disregard this notice.  Thank you for choosing [facility] for your healthcare needs.       Sincerely,       [facility] Specialty Scheduling

## 2021-06-26 NOTE — PROGRESS NOTES
OFFICE VISIT NOTE      Assessment & Plan   Lydia Alonzo is a 55 y.o. female with hyperlipidemia for which she takes rosuvastatin 5mg. While there is very good improvement, she agrees to increase to 10mg to hopefully get more improvement. Recheck lipids later this year - sometime in the fall.    Neck pain - she's going to try posture therapy through PT and possibly massage, along with continued acupunture. Hopefully these will help, as the neck pain continues to be a significant problem.      Diagnoses and all orders for this visit:    Neck pain  -     Ambulatory referral to Adult PT- External  -     Ambulatory referral to Adult PT- External  -     Ambulatory referral to Adult PT- External    Hyperlipidemia LDL goal <130  -     Discontinue: rosuvastatin (CRESTOR) 10 MG tablet; Take 0.5 tablets (5 mg total) by mouth at bedtime.  Dispense: 90 tablet; Refill: 4  -     rosuvastatin (CRESTOR) 10 MG tablet; Take 1 tablet (10 mg total) by mouth at bedtime.  Dispense: 90 tablet; Refill: 4    Arthropathy of cervical facet joint  -     Ambulatory referral to Adult PT- External  -     Ambulatory referral to Adult PT- External  -     Ambulatory referral to Adult PT- External    Pain, upper back  -     Ambulatory referral to Adult PT- External  -     Ambulatory referral to Adult PT- External  -     Ambulatory referral to Adult PT- External        Ritika Torres MD    30 minutes spent on the date of the encounter doing chart review, history and exam, documentation and further activities per the note        Subjective:   Chief Complaint:  Follow-up and Neck Pain (Chronic)    55 y.o. female.     1) tolerating rosuvastatin 5mg well. Remembers it all the time. She's glad to see her lipids are better.    2) neck pain continues to be a problem given her work. She would like referrals to PT. She heard of posture therapy and would like to try that. She wrote down the names of the places that offer it. She plans to keep up with  "acupuncture. She still has the pain and it makes her work quite challengning.    Current Outpatient Medications   Medication Sig     alendronate (FOSAMAX) 70 MG tablet Take 1 tablet (70 mg total) by mouth every 7 days.     estradiol (ESTRACE) 1 MG tablet Take 1 tablet (1 mg total) by mouth daily.     omega-3 acid ethyl esters (LOVAZA) 1 gram capsule Take 2 tablets by mouth.     progesterone (PROMETRIUM) 100 MG capsule Take 1 capsule (100 mg total) by mouth daily.     rosuvastatin (CRESTOR) 10 MG tablet Take 1 tablet (10 mg total) by mouth at bedtime.     sulfacetamide-prednisoLONE (VASOCIDIN) 10 %-0.23 % (0.25 %) ophthalmic solution      busPIRone (BUSPAR) 5 MG tablet Take 1 tablet (5 mg total) by mouth 3 (three) times a day.     estradiol (CLIMARA) 0.1 mg/24 hr Place 1 patch on the skin once a week.     gabapentin (NEURONTIN) 300 MG capsule Take 300-900 mg by mouth.     gabapentin (NEURONTIN) 300 MG capsule      milnacipran (SAVELLA) 12.5 mg (5)-25 mg(8)-50 mg(42) DsPk Take as directed     milnacipran (SAVELLA) 12.5 mg (5)-25 mg(8)-50 mg(42) DsPk Take as directed     omega-3 acid ethyl esters (LOVAZA) 1 gram capsule Take 2 tablets by mouth.     progesterone (PROMETRIUM) 100 MG capsule Take 100 mg by mouth.       PSFHx: appropriate sections of PMH completed/filled in   Tobacco Status:  She  reports that she has never smoked. She has never used smokeless tobacco.    Review of Systems:  No fever.  No rash. All other systems negative except as noted above.    Objective:    BP 98/64   Pulse 63   Temp 98.1  F (36.7  C) (Oral)   Resp 17   Ht 5' 3\" (1.6 m)   Wt 104 lb 8 oz (47.4 kg)   SpO2 99%   BMI 18.51 kg/m    GENERAL: No acute distress, sits with head forward most of the time, thin    Lipids reviewed          "

## 2021-06-27 NOTE — PROGRESS NOTES
Progress Notes by Ritika Torres MD at 1/15/2019  7:00 AM     Author: Ritika Torres MD Service: -- Author Type: Physician    Filed: 1/15/2019  8:22 AM Encounter Date: 1/15/2019 Status: Signed    : Ritika Torres MD (Physician)       FEMALE PREVENTATIVE EXAM    Assessment and Plan:       1. Visit for screening mammogram  She will do this soon - arrange on her own.    2. Macrocytosis  Requested, reasonable.  - HM1(CBC and Differential)  - Vitamin B12  - HM1 (CBC with Diff)    3. Hyperlipidemia LDL goal <160  Recheck, monitor. I suggested a baby aspirin 2 times per week (she found daily caused a lot of bleeding)  - Lipid Cascade FASTING    4. Excessive cerumen in both ear canals  She'll put mineral oil or other oil in her ears to help this to clear.    5. Routine general medical examination at a health care facility  Recommended Shingrex and she agrees to get it, but will do in Feb. So she can get the 2nd one on time (at 2 months).        Next follow up:  Return in about 1 year (around 1/15/2020).    Immunization Review  Adult Imm Review: recommend Shingrix - she'll do this via nurse-only when she knows she can get #2 in 2 months      I discussed the following with the patient:   Adult Healthy Living: Importance of regular exercise  Healthy nutrition  Getting adequate sleep  Stress management    I have had an Advance Directives discussion with the patient.              Subjective:   Chief Complaint: Lydia Alonzo is an 53 y.o. female here for a preventative health visit.     HPI:  Here for an annual check up. No new problems. Was relieved to find that there is no rheumatological problem going on. She gets herself checked by a dermatologist due to many skin lesions. She plans to get a mammogram done probably at [facility] or [facility].    Healthy Habits  Are you taking a daily aspirin? No  Do you typically exercising at least 40 min, 3-4 times per week?  Yes  Do you usually eat at least 4  "servings of fruit and vegetables a day, include whole grains and fiber and avoid regularly eating high fat foods? Yes  Have you had an eye exam in the past two years? Yes  Do you see a dentist twice per year? Yes  Do you have any concerns regarding sleep? No    Safety Screen  If you own firearms, are they secured in a locked gun cabinet or with trigger locks? The patient does not own any firearms  Do you feel you are safe where you are living?: Yes (1/15/2019  7:07 AM)  Do you feel you are safe in your relationship(s)?: Yes (1/15/2019  7:07 AM)      Review of Systems:  Please see above.  The rest of the review of systems are negative for all systems.    She has a dermatologist look over her back skin lesions.  She does a regular self-breast exam monthly and feels no changes/lumps (including on the right where laterally there is more fullness)    Pap History:   Last 3 PAP and HPV Results:   Cancer Screening       Status Date      MAMMOGRAM Overdue 10/11/2018      Done 10/11/2017 she will schedule and do this before the end of this year    PAP SMEAR Next Due 12/20/2022      Done 12/20/2017 GYNECOLOGIC CYTOLOGY (PAP SMEAR)    COLONOSCOPY Next Due 10/11/2027      Done 10/11/2017 patient will set this up before the end of the year              History     Reviewed By Date/Time Sections Reviewed    Becky Desai LPN 1/15/2019  7:06 AM Tobacco            Objective:   Vital Signs:   Visit Vitals  BP (!) 84/52   Pulse 63   Temp 97.9  F (36.6  C) (Oral)   Resp 12   Ht 5' 4.17\" (1.63 m)   Wt 104 lb 1.9 oz (47.2 kg)   SpO2 100%   BMI 17.78 kg/m           Physical Exam:  General Appearance: Alert, cooperative, no distress, appears stated age, very thin  Head: Normocephalic, without obvious abnormality, atraumatic  Eyes: PERRL, conjunctiva/corneas clear, EOM's intact  Ears: Normal TM's but only the top edges could be seen due to cerumen filling the external ear canals, both ears; it is shiny and soft-looking  Nose: Nares " normal, septum midline,mucosa normal, no drainage  Throat: Lips, mucosa, and tongue normal; teeth and gums normal  Neck: Supple, symmetrical, trachea midline, no adenopathy;  thyroid: not enlarged, symmetric, no tenderness/mass/nodules  Back: Symmetric, no curvature, ROM normal, no CVA tenderness  Lungs: Clear to auscultation bilaterally, respirations unlabored  Breasts: Hang without dimpling, even with pectoral muscle contraction, No breast masses, tenderness, or nipple discharge. Right breast laterally has a fullness that abruptly ends vs left side gradually decreases.  Heart: Regular rate and rhythm, S1 and S2 normal, no murmur  Abdomen: Soft, non-tender, bowel sounds active,  no masses, no organomegaly  Extremities: Extremities normal, atraumatic, no cyanosis or edema  Skin: Skin color, texture, turgor normal, no rashes; many lesions on her back and all over  Neurologic: Normal       The ASCVD Risk score (Higginson BERT Jr., et al., 2013) failed to calculate for the following reasons:    The valid systolic blood pressure range is 90 to 200 mmHg    The valid total cholesterol range is 130 to 320 mg/dL    Unable to determine if patient is Non- African American         Medication List           Accurate as of 1/15/19  8:19 AM. If you have any questions, ask your nurse or doctor.               CONTINUE taking these medications    alendronate 70 MG tablet  Also known as:  FOSAMAX  INSTRUCTIONS:  Take 1 tablet (70 mg total) by mouth every 7 days.        * ESTRADIOL TRANSDERMAL PATCH TD  INSTRUCTIONS:  Place on the skin.        * estradiol 0.1 mg/24 hr  Also known as:  CLIMARA  INSTRUCTIONS:  Place 1 patch on the skin once a week.        omega-3 acid ethyl esters 1 gram capsule  Also known as:  LOVAZA  INSTRUCTIONS:  Take 2 tablets by mouth.        progesterone 100 MG capsule  Also known as:  PROMETRIUM  INSTRUCTIONS:  Take 1 capsule (100 mg total) by mouth daily.            * This list has 2 medication(s) that are  the same as other medications prescribed for you. Read the directions carefully, and ask your doctor or other care provider to review them with you.               Where to Get Your Medications      These medications were sent to [id number] - [facility] [address] [facility]  [address] [facility] [address]    Phone:  [phone]     alendronate 70 MG tablet    estradiol 0.1 mg/24 hr    progesterone 100 MG capsule         Additional Screenings Completed Today:

## 2021-07-03 NOTE — ADDENDUM NOTE
Addendum Note by Abiola Torres MD at 10/28/2019  9:31 PM     Author: Abiola Torres MD Service: -- Author Type: Physician    Filed: 10/28/2019  9:31 PM Encounter Date: 10/14/2019 Status: Signed    : Abiola Torres MD (Physician)    Addended by: ABIOLA TORRES on: 10/28/2019 09:31 PM        Modules accepted: Orders

## 2021-07-03 NOTE — ADDENDUM NOTE
Addendum Note by Abiola Torres MD at 12/17/2020  9:23 AM     Author: Abiola Torres MD Service: -- Author Type: Physician    Filed: 12/17/2020  9:23 AM Encounter Date: 12/15/2020 Status: Signed    : Abiola Torres MD (Physician)    Addended by: ABIOLA TORRES on: 12/17/2020 09:23 AM        Modules accepted: Orders

## 2021-07-06 VITALS
BODY MASS INDEX: 18.52 KG/M2 | WEIGHT: 104.5 LBS | RESPIRATION RATE: 17 BRPM | OXYGEN SATURATION: 99 % | DIASTOLIC BLOOD PRESSURE: 64 MMHG | SYSTOLIC BLOOD PRESSURE: 98 MMHG | HEIGHT: 63 IN | TEMPERATURE: 98.1 F | HEART RATE: 63 BPM

## 2021-08-04 ENCOUNTER — TRANSFERRED RECORDS (OUTPATIENT)
Dept: HEALTH INFORMATION MANAGEMENT | Facility: CLINIC | Age: 56
End: 2021-08-04

## 2021-08-11 ENCOUNTER — LAB (OUTPATIENT)
Dept: LAB | Facility: CLINIC | Age: 56
End: 2021-08-11
Payer: COMMERCIAL

## 2021-08-11 DIAGNOSIS — E78.5 HYPERLIPIDEMIA LDL GOAL <130: ICD-10-CM

## 2021-08-11 LAB
CHOLEST SERPL-MCNC: 195 MG/DL
FASTING STATUS PATIENT QL REPORTED: YES
HDLC SERPL-MCNC: 96 MG/DL
LDLC SERPL CALC-MCNC: 89 MG/DL
TRIGL SERPL-MCNC: 48 MG/DL

## 2021-08-11 PROCEDURE — 36415 COLL VENOUS BLD VENIPUNCTURE: CPT

## 2021-08-11 PROCEDURE — 80061 LIPID PANEL: CPT

## 2021-08-25 ENCOUNTER — MYC MEDICAL ADVICE (OUTPATIENT)
Dept: FAMILY MEDICINE | Facility: CLINIC | Age: 56
End: 2021-08-25

## 2021-08-25 DIAGNOSIS — R52 PAIN: ICD-10-CM

## 2021-08-25 DIAGNOSIS — M47.812 ARTHROPATHY OF CERVICAL FACET JOINT: Primary | ICD-10-CM

## 2021-08-27 ENCOUNTER — MYC MEDICAL ADVICE (OUTPATIENT)
Dept: FAMILY MEDICINE | Facility: CLINIC | Age: 56
End: 2021-08-27

## 2021-08-27 RX ORDER — PREGABALIN 100 MG/1
100 CAPSULE ORAL 3 TIMES DAILY
Qty: 90 CAPSULE | Refills: 3 | Status: SHIPPED | OUTPATIENT
Start: 2021-09-27 | End: 2021-10-12

## 2021-08-27 RX ORDER — PREGABALIN 25 MG/1
CAPSULE ORAL
Qty: 158 CAPSULE | Refills: 0 | Status: SHIPPED | OUTPATIENT
Start: 2021-08-27 | End: 2021-10-12

## 2021-09-05 ENCOUNTER — HEALTH MAINTENANCE LETTER (OUTPATIENT)
Age: 56
End: 2021-09-05

## 2021-09-28 ENCOUNTER — TRANSFERRED RECORDS (OUTPATIENT)
Dept: HEALTH INFORMATION MANAGEMENT | Facility: CLINIC | Age: 56
End: 2021-09-28

## 2021-10-01 ENCOUNTER — MYC MEDICAL ADVICE (OUTPATIENT)
Dept: FAMILY MEDICINE | Facility: CLINIC | Age: 56
End: 2021-10-01

## 2021-10-01 NOTE — TELEPHONE ENCOUNTER
Hi Ignacia -  Next week on Wed., Oct 6, we have a staff meeting. This usually starts at 7:30, so they do not book my 7am slot that day. I could see you that day, from 7 - 7:30 (hard stop at 7:30). Does that work? If not, please let me know and I will look for another spot.  CHUCK Torres

## 2021-10-03 ENCOUNTER — MYC MEDICAL ADVICE (OUTPATIENT)
Dept: FAMILY MEDICINE | Facility: CLINIC | Age: 56
End: 2021-10-03

## 2021-10-06 ENCOUNTER — TRANSFERRED RECORDS (OUTPATIENT)
Dept: HEALTH INFORMATION MANAGEMENT | Facility: CLINIC | Age: 56
End: 2021-10-06

## 2021-10-06 NOTE — TELEPHONE ENCOUNTER
"YAMILA  I also put note in your blue folder.      Reason for Call:  Aminta (mom) drop-off note at  this morning would like to give note to Dr. Torres.    Detailed comments: Note reads:    \"Dr. Torres, Lydia is very bloated and miserable, is there anyway she can see you?  She is on-call today but not on Thursday or Friday. Where should she go if not feeling any better?    Thank you for your help -    Aminta Beth's phone [phone]\"    Phone Number Patient can be reached at: Cell number on file:    Telephone Information:   Mobile [phone]   Mobile [phone]       Best Time: n/a    Can we leave a detailed message on this number? Not Applicable    Call taken on 10/6/2021 at 10:09 AM by Clarice Farley    "

## 2021-10-11 ENCOUNTER — TRANSFERRED RECORDS (OUTPATIENT)
Dept: HEALTH INFORMATION MANAGEMENT | Facility: CLINIC | Age: 56
End: 2021-10-11

## 2021-10-12 RX ORDER — VALACYCLOVIR HYDROCHLORIDE 1 G/1
TABLET, FILM COATED ORAL
COMMUNITY
Start: 2020-12-15

## 2021-10-12 RX ORDER — ROSUVASTATIN CALCIUM 10 MG/1
10 TABLET, COATED ORAL
COMMUNITY
Start: 2021-06-09 | End: 2022-05-31

## 2021-10-13 ENCOUNTER — OFFICE VISIT (OUTPATIENT)
Dept: FAMILY MEDICINE | Facility: CLINIC | Age: 56
End: 2021-10-13
Payer: COMMERCIAL

## 2021-10-13 VITALS
RESPIRATION RATE: 16 BRPM | BODY MASS INDEX: 19.49 KG/M2 | WEIGHT: 110 LBS | DIASTOLIC BLOOD PRESSURE: 58 MMHG | TEMPERATURE: 98 F | OXYGEN SATURATION: 97 % | HEART RATE: 70 BPM | SYSTOLIC BLOOD PRESSURE: 100 MMHG

## 2021-10-13 DIAGNOSIS — F51.02 ADJUSTMENT INSOMNIA: ICD-10-CM

## 2021-10-13 DIAGNOSIS — R79.1 ABNORMAL PARTIAL THROMBOPLASTIN TIME (PTT): ICD-10-CM

## 2021-10-13 DIAGNOSIS — D62 ANEMIA DUE TO BLOOD LOSS, ACUTE: ICD-10-CM

## 2021-10-13 DIAGNOSIS — N83.8 OVARIAN MASS: ICD-10-CM

## 2021-10-13 DIAGNOSIS — Z01.818 PREOP GENERAL PHYSICAL EXAM: Primary | ICD-10-CM

## 2021-10-13 PROBLEM — R79.89 OTHER SPECIFIED ABNORMAL FINDINGS OF BLOOD CHEMISTRY: Status: ACTIVE | Noted: 2017-12-05

## 2021-10-13 PROBLEM — K57.30 DIVERTICULAR DISEASE OF LARGE INTESTINE: Status: ACTIVE | Noted: 2019-11-20

## 2021-10-13 PROBLEM — M35.00 SICCA SYNDROME (H): Status: ACTIVE | Noted: 2017-11-15

## 2021-10-13 LAB
ALBUMIN SERPL-MCNC: 3.2 G/DL (ref 3.5–5)
ALBUMIN UR-MCNC: NEGATIVE MG/DL
ALP SERPL-CCNC: 68 U/L (ref 45–120)
ALT SERPL W P-5'-P-CCNC: 107 U/L (ref 0–45)
ANION GAP SERPL CALCULATED.3IONS-SCNC: 11 MMOL/L (ref 5–18)
APPEARANCE UR: CLEAR
APTT PPP: 43 SECONDS (ref 22–38)
AST SERPL W P-5'-P-CCNC: 86 U/L (ref 0–40)
BASOPHILS # BLD AUTO: 0.1 10E3/UL (ref 0–0.2)
BASOPHILS NFR BLD AUTO: 1 %
BILIRUB SERPL-MCNC: 0.3 MG/DL (ref 0–1)
BILIRUB UR QL STRIP: NEGATIVE
BUN SERPL-MCNC: 10 MG/DL (ref 8–22)
CALCIUM SERPL-MCNC: 9 MG/DL (ref 8.5–10.5)
CEA SERPL-MCNC: 11.3 NG/ML (ref 0–3)
CHLORIDE BLD-SCNC: 95 MMOL/L (ref 98–107)
CO2 SERPL-SCNC: 24 MMOL/L (ref 22–31)
COLOR UR AUTO: YELLOW
CREAT SERPL-MCNC: 0.64 MG/DL (ref 0.6–1.1)
EOSINOPHIL # BLD AUTO: 0.2 10E3/UL (ref 0–0.7)
EOSINOPHIL NFR BLD AUTO: 3 %
ERYTHROCYTE [DISTWIDTH] IN BLOOD BY AUTOMATED COUNT: 14.5 % (ref 10–15)
GFR SERPL CREATININE-BSD FRML MDRD: >90 ML/MIN/1.73M2
GLUCOSE BLD-MCNC: 97 MG/DL (ref 70–125)
GLUCOSE UR STRIP-MCNC: NEGATIVE MG/DL
HCT VFR BLD AUTO: 25.6 % (ref 35–47)
HGB BLD-MCNC: 8.8 G/DL (ref 11.7–15.7)
HGB UR QL STRIP: NEGATIVE
IMM GRANULOCYTES # BLD: 0 10E3/UL
IMM GRANULOCYTES NFR BLD: 0 %
INR PPP: 1.03 (ref 0.85–1.15)
KETONES UR STRIP-MCNC: NEGATIVE MG/DL
LDH SERPL L TO P-CCNC: 240 U/L (ref 125–220)
LEUKOCYTE ESTERASE UR QL STRIP: NEGATIVE
LYMPHOCYTES # BLD AUTO: 0.5 10E3/UL (ref 0.8–5.3)
LYMPHOCYTES NFR BLD AUTO: 6 %
MCH RBC QN AUTO: 32.8 PG (ref 26.5–33)
MCHC RBC AUTO-ENTMCNC: 34.4 G/DL (ref 31.5–36.5)
MCV RBC AUTO: 96 FL (ref 78–100)
MONOCYTES # BLD AUTO: 0.7 10E3/UL (ref 0–1.3)
MONOCYTES NFR BLD AUTO: 8 %
NEUTROPHILS # BLD AUTO: 7.1 10E3/UL (ref 1.6–8.3)
NEUTROPHILS NFR BLD AUTO: 82 %
NITRATE UR QL: NEGATIVE
PH UR STRIP: 6.5 [PH] (ref 5–7)
PLATELET # BLD AUTO: 344 10E3/UL (ref 150–450)
POTASSIUM BLD-SCNC: 4.7 MMOL/L (ref 3.5–5)
PROT SERPL-MCNC: 6.2 G/DL (ref 6–8)
RBC # BLD AUTO: 2.68 10E6/UL (ref 3.8–5.2)
SARS-COV-2 RNA RESP QL NAA+PROBE: NEGATIVE
SODIUM SERPL-SCNC: 130 MMOL/L (ref 136–145)
SP GR UR STRIP: 1.01 (ref 1–1.03)
UROBILINOGEN UR STRIP-ACNC: 0.2 E.U./DL
WBC # BLD AUTO: 8.7 10E3/UL (ref 4–11)

## 2021-10-13 PROCEDURE — 85025 COMPLETE CBC W/AUTO DIFF WBC: CPT | Performed by: FAMILY MEDICINE

## 2021-10-13 PROCEDURE — 86301 IMMUNOASSAY TUMOR CA 19-9: CPT | Mod: 90 | Performed by: FAMILY MEDICINE

## 2021-10-13 PROCEDURE — 99417 PROLNG OP E/M EACH 15 MIN: CPT | Performed by: FAMILY MEDICINE

## 2021-10-13 PROCEDURE — U0005 INFEC AGEN DETEC AMPLI PROBE: HCPCS | Performed by: FAMILY MEDICINE

## 2021-10-13 PROCEDURE — 99215 OFFICE O/P EST HI 40 MIN: CPT | Performed by: FAMILY MEDICINE

## 2021-10-13 PROCEDURE — 80053 COMPREHEN METABOLIC PANEL: CPT | Performed by: FAMILY MEDICINE

## 2021-10-13 PROCEDURE — 81003 URINALYSIS AUTO W/O SCOPE: CPT | Performed by: FAMILY MEDICINE

## 2021-10-13 PROCEDURE — 83615 LACTATE (LD) (LDH) ENZYME: CPT | Performed by: FAMILY MEDICINE

## 2021-10-13 PROCEDURE — 82378 CARCINOEMBRYONIC ANTIGEN: CPT | Performed by: FAMILY MEDICINE

## 2021-10-13 PROCEDURE — 36415 COLL VENOUS BLD VENIPUNCTURE: CPT | Performed by: FAMILY MEDICINE

## 2021-10-13 PROCEDURE — 86304 IMMUNOASSAY TUMOR CA 125: CPT | Performed by: FAMILY MEDICINE

## 2021-10-13 PROCEDURE — U0003 INFECTIOUS AGENT DETECTION BY NUCLEIC ACID (DNA OR RNA); SEVERE ACUTE RESPIRATORY SYNDROME CORONAVIRUS 2 (SARS-COV-2) (CORONAVIRUS DISEASE [COVID-19]), AMPLIFIED PROBE TECHNIQUE, MAKING USE OF HIGH THROUGHPUT TECHNOLOGIES AS DESCRIBED BY CMS-2020-01-R: HCPCS | Performed by: FAMILY MEDICINE

## 2021-10-13 PROCEDURE — 85730 THROMBOPLASTIN TIME PARTIAL: CPT | Performed by: FAMILY MEDICINE

## 2021-10-13 PROCEDURE — 85610 PROTHROMBIN TIME: CPT | Performed by: FAMILY MEDICINE

## 2021-10-13 PROCEDURE — 99000 SPECIMEN HANDLING OFFICE-LAB: CPT | Performed by: FAMILY MEDICINE

## 2021-10-13 RX ORDER — ZOLPIDEM TARTRATE 6.25 MG/1
6.25 TABLET, FILM COATED, EXTENDED RELEASE ORAL
Qty: 20 TABLET | Refills: 0 | Status: SHIPPED | OUTPATIENT
Start: 2021-10-13 | End: 2022-12-13

## 2021-10-13 NOTE — PROGRESS NOTES
[address]  [place] [address]  Phone: [phone]  Fax: [phone]  Primary Provider: Abiola Torres  Pre-op Performing Provider: ABIOLA TORRES    PREOPERATIVE EVALUATION:  Today's date: 10/13/2021    Lydia Alonzo is a 56 year old female who presents for a preoperative evaluation.    Surgical Information:  Surgery/Procedure: radical hysterectomy with bilateral ooph-salpingectomy  [facility]  Surgeon: Dr. Samaria Red  Surgery Date: 10/19/2021  Time of Surgery: afternoon (to follow)  Where patient plans to recover: At home with family (with elderly mother)  Fax number for surgical facility: [facility]    Type of Anesthesia Anticipated: General    Assessment & Plan     The proposed surgical procedure is considered INTERMEDIATE risk.    CBC shows a Hgb of 8.8 (on 10/6 it was 12.3). Also, aPTT is slightly elevated. Even though the patient has not yet seen Dr. Red (who is not available), I called [facility] and was put in touch with Dr. Devries who is on call today plus Shobha, a nurse practitioner who works with Dr. Red. Their recommendation, when we spoke about 4:30pm, was for a STAT CT of abd and pelvis to check for free fluid or a hematoma from the tumor. If this was found, admit, stabilize and consider surgery sooner than next week. If there is no free fluid, then consider GI source because the mass is big enough to put pressure on the bowel. Check hemoccults. If positive, then Ignacia should be scoped prior to surgery consultation 10/18.    Ignacia was working but took my initial call. She did not want to go to [place] [facility] for the stat imaging -- Covid numbers are very high, ERs are very full and she knows she will sit there a long time. She begged to wait overnight. I was really worried about bleeding during the night. In consulting with a colleague here at [place], we decided checking a Hgb now could be useful. If the  Hgb is stable, it might be OK, as long as Ignacia understands the risk she is choosing, to wait until morning.    Ignacia did not want to even do a hemoglobin tonight due to the long wait (and thus Covid exposure). She explained she's comfortable waiting overnight and then doing the hgb and CT in the AM. She promised to call for help if she feels differently or has any change. While I let her know I was not comfortable with waiting overnight for tests, I had no way to make her go in now. She said she would go home and wait to hear if the CT could be done in the morning.     I called her back at 6:15 and told her to report to [facility] check in desk at 7:45 for an 8am CT. She said she would come to the [facility] at 7am for a stat hgb, too.    Preop general physical exam  She has had a significant hgb drop and thus will get another CT abd-pelvis 10/14 AM plus another hgb. If she is bleeding into her abdomen, she will require hospitalization - which she really does not want, but will do if it is a matter of life and death. Her slightly abnormal PTT must be noted, too.  - Asymptomatic COVID-19 Virus (Coronavirus) by PCR  - CBC with platelets and differential  - Comprehensive metabolic panel (BMP + Alb, Alk Phos, ALT, AST, Total. Bili, TP)  - UA Macro with Reflex to Micro and Culture - lab collect  - INR  - Partial thromboplastin time  - Asymptomatic COVID-19 Virus (Coronavirus) by PCR Nose  - CBC with platelets and differential  - Comprehensive metabolic panel (BMP + Alb, Alk Phos, ALT, AST, Total. Bili, TP)  - INR  - Partial thromboplastin time  - UA Macro with Reflex to Micro and Culture - lab collect  - Lactate Dehydrogenase  - CBC with platelets and differential  - Lactate Dehydrogenase  - Partial thromboplastin time  - CBC with platelets and differential    Ovarian mass  This is so large she must have an open procedure. With impingement on the kidney drainage and the bowel, it really needs to be removed  ASAP.  - Asymptomatic COVID-19 Virus (Coronavirus) by PCR  - CBC with platelets and differential  - Comprehensive metabolic panel (BMP + Alb, Alk Phos, ALT, AST, Total. Bili, TP)  - UA Macro with Reflex to Micro and Culture - lab collect  - INR  - Partial thromboplastin time  -   - CEA  - Cancer antigen 19-9  - Asymptomatic COVID-19 Virus (Coronavirus) by PCR Nose  - CBC with platelets and differential  - Comprehensive metabolic panel (BMP + Alb, Alk Phos, ALT, AST, Total. Bili, TP)  - INR  - Partial thromboplastin time  - UA Macro with Reflex to Micro and Culture - lab collect  - Lactate Dehydrogenase  - CBC with platelets and differential  - Lactate Dehydrogenase  - Factor 8 assay  - Von Willebrand antigen  - von Willebrand Factor Activity  - von Willebrand Interpretation  - Partial thromboplastin time  - CBC with platelets and differential    Adjustment insomnia  She will try taking a med to help her get some sleep. I strongly emphasized the importance of this in preparation for surgery.  - zolpidem ER (AMBIEN CR) 6.25 MG CR tablet  Dispense: 20 tablet; Refill: 0  - CBC with platelets and differential  - Partial thromboplastin time  - CBC with platelets and differential    Anemia due to blood loss, acute  See above - recheck hgb, recheck CT 10/14.  - Lactate Dehydrogenase  - CBC with platelets and differential  - Lactate Dehydrogenase  - Factor 8 assay  - Von Willebrand antigen  - von Willebrand Factor Activity  - von Willebrand Interpretation  - Partial thromboplastin time  - CBC with platelets and differential    Abnormal partial thromboplastin time (PTT)  Noted. Hoping to do additional bleeding studies prior to surgery.  - aPTT Mixing Studies ([facility])  - Factor 8 assay  - Von Willebrand antigen  - von Willebrand Factor Activity  - von Willebrand Interpretation  - Partial thromboplastin time  - CBC with platelets and differential      Risks and Recommendations:  The patient has the following additional  risks and recommendations for perioperative complications: none.    Medication Instructions:  patient will stop all vitamins as of today 10/13; hold statin medication on morning of surgery; she will be taking pain medications prior to surgery and possibly Ambien CR to help with sleep    RECOMMENDATION:  APPROVAL given for surgery; will be rechecking CBC to follow the hemoglobin    Review of external notes as documented above     Discussion of management or test interpretation with external physician/other qualified healthcare professional/appropriate source - Dr. Red and her assistant regarding hemoglobin drop      90 minutes spent on the date of the encounter doing chart review, history and exam, documentation and further activities per the note                Subjective     HPI related to upcoming procedure: progressive bloating recently with abd pain, found to be an ovarian tumor    Preop Questions 10/13/2021   1. Have you ever had a heart attack or stroke? No   2. Have you ever had surgery on your heart or blood vessels, such as a stent placement, a coronary artery bypass, or surgery on an artery in your head, neck, heart, or legs? No   3. Do you have chest pain with activity? No   4. Do you have a history of  heart failure? No   5. Do you currently have a cold, bronchitis or symptoms of other infection? No   6. Do you have a cough, shortness of breath, or wheezing? No   7. Do you or anyone in your family have previous history of blood clots? No   8. Do you or does anyone in your family have a serious bleeding problem such as prolonged bleeding following surgeries or cuts? No   9. Have you ever had problems with anemia or been told to take iron pills? No   10. Have you had any abnormal blood loss such as black, tarry or bloody stools, or abnormal vaginal bleeding? No   11. Have you ever had a blood transfusion? No   12. Are you willing to have a blood transfusion if it is medically needed before, during,  or after your surgery? Yes   13. Have you or any of your relatives ever had problems with anesthesia? No   14. Do you have sleep apnea, excessive snoring or daytime drowsiness? No   15. Do you have any artifical heart valves or other implanted medical devices like a pacemaker, defibrillator, or continuous glucose monitor? No   16. Do you have artificial joints? No   17. Are you allergic to latex? No   18. Is there any chance that you may be pregnant? No       Health Care Directive:  Patient does have a Health Care Directive or Living Will: Advance Directive received and scanned. Click on Code in the patient header to view. Her Mom and her brother are her #1 and #2 contacts.    Preoperative Review of :   reviewed - controlled substances prescribed by other outside provider(s).}    Status of Chronic Conditions:  SLEEP PROBLEM - Patient has a current issue with sleep - likely due to pain and anxiety. I strongly recommended she take additional pain medication (she has oxycodone) but she requested and prefers Ambien.    Neck pain problem - currently managed and OK.    Review of Systems  CONSTITUTIONAL: NEGATIVE for fever, chills, change in weight  INTEGUMENTARY/SKIN: NEGATIVE for worrisome rashes, moles or lesions  EYES: NEGATIVE for vision changes or irritation  ENT/MOUTH: NEGATIVE for ear, mouth and throat problems  RESP: NEGATIVE for significant cough or SOB  CV: NEGATIVE for chest pain, palpitations or peripheral edema  GI: NEGATIVE for nausea  : NEGATIVE for frequency, dysuria, or hematuria  MUSCULOSKELETAL: NEGATIVE for significant arthralgias  NEURO: NEGATIVE for weakness, dizziness or paresthesias  ENDOCRINE: NEGATIVE for temperature intolerance, skin/hair changes  HEME: NEGATIVE for bleeding problems  PSYCHIATRIC: NEGATIVE for changes in mood or affect    Patient Active Problem List    Diagnosis Date Noted     Anxiety disorder 01/15/2019     Priority: Medium     Depression 01/15/2019     Priority: Medium      Hyperlipidemia LDL goal <130 11/15/2017     Priority: Medium     Osteopenia 12/04/2013     Priority: Medium     Essential tremor 06/14/2007     Priority: Medium     Overview:   Essential and other specified forms of tremor (HRC)          Past Medical History:   Diagnosis Date     Essential tremor      History of colonoscopy 11/2019    needs double prep to be adequately prepared     Osteopenia     taking alendronate, calcium and vit D + lifts weights     Pap smear for cervical cancer screening 2017    neg pap and no high risk HPV; next due 2022     Past Surgical History:   Procedure Laterality Date     NO PAST SURGERIES       Current Outpatient Medications   Medication Sig Dispense Refill     alendronate (FOSAMAX) 70 MG tablet [ALENDRONATE (FOSAMAX) 70 MG TABLET] Take 1 tablet (70 mg total) by mouth every 7 days. 12 tablet 4     omega-3 acid ethyl esters (LOVAZA) 1 gram capsule [OMEGA-3 ACID ETHYL ESTERS (LOVAZA) 1 GRAM CAPSULE] Take 2 tablets by mouth.       omega-3 acid ethyl esters (LOVAZA) 1 gram capsule [OMEGA-3 ACID ETHYL ESTERS (LOVAZA) 1 GRAM CAPSULE] Take 2 tablets by mouth.       progesterone (PROMETRIUM) 100 MG capsule [PROGESTERONE (PROMETRIUM) 100 MG CAPSULE] Take 1 capsule (100 mg total) by mouth daily. 90 capsule 4     progesterone (PROMETRIUM) 100 MG capsule [PROGESTERONE (PROMETRIUM) 100 MG CAPSULE] Take 100 mg by mouth.       rosuvastatin (CRESTOR) 10 MG tablet Take 10 mg by mouth       rosuvastatin (CRESTOR) 10 MG tablet [ROSUVASTATIN (CRESTOR) 10 MG TABLET] Take 1 tablet (10 mg total) by mouth at bedtime. 90 tablet 4     sulfacetamide-prednisoLONE (VASOCIDIN) 10 %-0.23 % (0.25 %) ophthalmic solution [SULFACETAMIDE-PREDNISOLONE (VASOCIDIN) 10 %-0.23 % (0.25 %) OPHTHALMIC SOLUTION]   2     valACYclovir (VALTREX) 1000 mg tablet        zolpidem ER (AMBIEN CR) 6.25 MG CR tablet Take 1 tablet (6.25 mg) by mouth nightly as needed for sleep 20 tablet 0       Allergies   Allergen Reactions      Neomycin-Bacitracnzn-Polymyxnb [Triple Antibiotic] Rash        Social History     Tobacco Use     Smoking status: Never Smoker     Smokeless tobacco: Never Used   Substance Use Topics     Alcohol use: No     Family History   Problem Relation Age of Onset     No Known Problems Brother      History   Drug Use No         Objective     /58   Pulse 70   Temp 98  F (36.7  C) (Oral)   Resp 16   Wt 49.9 kg (110 lb)   SpO2 97%   BMI 19.49 kg/m      Physical Exam    GENERAL APPEARANCE: healthy, alert and no distress but frustrated with herself for not picking this problem up sooner     EYES: EOMI, PERRL     HENT: ear canals are mostly full of soft/shiny cerumen and the bits of  TM's I can see are normal; nose and mouth without ulcers or lesions     NECK: no adenopathy, no asymmetry, masses, or scars and thyroid normal to palpation     RESP: lungs clear to auscultation - no rales, rhonchi or wheezes     CV: regular rates and rhythm, normal S1 S2 and no murmur,     ABDOMEN: bowel sounds are present but she is markedly bloated and distended; she is tender all over with moderate palpation but not with percussion; due to the pain, I did not palpate deeply enough to feel the mass, although I'm sure it is directly palpable. With percussion, there is a lot of air on her right abd     MS: extremities - no gross deformities noted, no evidence of inflammation in joints, but 1+ pitting edema at the right ankle and trace edema at the left ankle     SKIN: no suspicious lesions or rashes     NEURO: mentation intact and speech normal; movements coordinated     PSYCH: mentation is sharp and affect normal/bright     LYMPHATICS: No cervical adenopathy    No results for input(s): HGB, PLT, INR, NA, POTASSIUM, CR, A1C in the last 55577 hours.     Diagnostics:  Recent Results (from the past 24 hour(s))   CEA    Collection Time: 10/13/21  7:59 AM   Result Value Ref Range    CEA 11.3 (H) 0.0 - 3.0 ng/mL   Comprehensive metabolic panel  (BMP + Alb, Alk Phos, ALT, AST, Total. Bili, TP)    Collection Time: 10/13/21  7:59 AM   Result Value Ref Range    Sodium 130 (L) 136 - 145 mmol/L    Potassium 4.7 3.5 - 5.0 mmol/L    Chloride 95 (L) 98 - 107 mmol/L    Carbon Dioxide (CO2) 24 22 - 31 mmol/L    Anion Gap 11 5 - 18 mmol/L    Urea Nitrogen 10 8 - 22 mg/dL    Creatinine 0.64 0.60 - 1.10 mg/dL    Calcium 9.0 8.5 - 10.5 mg/dL    Glucose 97 70 - 125 mg/dL    Alkaline Phosphatase 68 45 - 120 U/L    AST 86 (H) 0 - 40 U/L     (H) 0 - 45 U/L    Protein Total 6.2 6.0 - 8.0 g/dL    Albumin 3.2 (L) 3.5 - 5.0 g/dL    Bilirubin Total 0.3 0.0 - 1.0 mg/dL    GFR Estimate >90 >60 mL/min/1.73m2   INR    Collection Time: 10/13/21  7:59 AM   Result Value Ref Range    INR 1.03 0.85 - 1.15   Partial thromboplastin time    Collection Time: 10/13/21  7:59 AM   Result Value Ref Range    aPTT 43 (H) 22 - 38 Seconds   CBC with platelets and differential    Collection Time: 10/13/21  7:59 AM   Result Value Ref Range    WBC Count 8.7 4.0 - 11.0 10e3/uL    RBC Count 2.68 (L) 3.80 - 5.20 10e6/uL    Hemoglobin 8.8 (L) 11.7 - 15.7 g/dL    Hematocrit 25.6 (L) 35.0 - 47.0 %    MCV 96 78 - 100 fL    MCH 32.8 26.5 - 33.0 pg    MCHC 34.4 31.5 - 36.5 g/dL    RDW 14.5 10.0 - 15.0 %    Platelet Count 344 150 - 450 10e3/uL    % Neutrophils 82 %    % Lymphocytes 6 %    % Monocytes 8 %    % Eosinophils 3 %    % Basophils 1 %    % Immature Granulocytes 0 %    Absolute Neutrophils 7.1 1.6 - 8.3 10e3/uL    Absolute Lymphocytes 0.5 (L) 0.8 - 5.3 10e3/uL    Absolute Monocytes 0.7 0.0 - 1.3 10e3/uL    Absolute Eosinophils 0.2 0.0 - 0.7 10e3/uL    Absolute Basophils 0.1 0.0 - 0.2 10e3/uL    Absolute Immature Granulocytes 0.0 <=0.0 10e3/uL   UA Macro with Reflex to Micro and Culture - lab collect    Collection Time: 10/13/21  8:29 AM    Specimen: Urine, Clean Catch   Result Value Ref Range    Color Urine Yellow Colorless, Straw, Light Yellow, Yellow    Appearance Urine Clear Clear    Glucose  Urine Negative Negative mg/dL    Bilirubin Urine Negative Negative    Ketones Urine Negative Negative mg/dL    Specific Gravity Urine 1.015 1.005 - 1.030    Blood Urine Negative Negative    pH Urine 6.5 5.0 - 7.0    Protein Albumin Urine Negative Negative mg/dL    Urobilinogen Urine 0.2 0.2, 1.0 E.U./dL    Nitrite Urine Negative Negative    Leukocyte Esterase Urine Negative Negative   Lactate Dehydrogenase    Collection Time: 10/13/21 11:58 AM   Result Value Ref Range    Lactate Dehydrogenase 240 (H) 125 - 220 U/L      No EKG required, no history of coronary heart disease, significant arrhythmia, peripheral arterial disease or other structural heart disease.    Revised Cardiac Risk Index (RCRI):  The patient has the following serious cardiovascular risks for perioperative complications:   - No serious cardiac risks = 0 points     RCRI Interpretation: 1 point: Class II (low risk - 0.9% complication rate)           Signed Electronically by: Ritika Torres MD  Copy of this evaluation report is provided to requesting physician.

## 2021-10-14 ENCOUNTER — LAB (OUTPATIENT)
Dept: LAB | Facility: CLINIC | Age: 56
End: 2021-10-14
Payer: COMMERCIAL

## 2021-10-14 DIAGNOSIS — F51.02 ADJUSTMENT INSOMNIA: ICD-10-CM

## 2021-10-14 DIAGNOSIS — R79.1 ABNORMAL PARTIAL THROMBOPLASTIN TIME (PTT): ICD-10-CM

## 2021-10-14 DIAGNOSIS — D62 ANEMIA DUE TO BLOOD LOSS, ACUTE: ICD-10-CM

## 2021-10-14 DIAGNOSIS — Z01.818 PREOP GENERAL PHYSICAL EXAM: ICD-10-CM

## 2021-10-14 DIAGNOSIS — N83.8 OVARIAN MASS: ICD-10-CM

## 2021-10-14 LAB
APTT PPP: 43 SECONDS (ref 22–38)
BASOPHILS # BLD AUTO: 0.1 10E3/UL (ref 0–0.2)
BASOPHILS NFR BLD AUTO: 1 %
CANCER AG125 SERPL-ACNC: 45 U/ML (ref 0–35)
CANCER AG19-9 SERPL IA-ACNC: 513 U/ML
EOSINOPHIL # BLD AUTO: 0.3 10E3/UL (ref 0–0.7)
EOSINOPHIL NFR BLD AUTO: 4 %
ERYTHROCYTE [DISTWIDTH] IN BLOOD BY AUTOMATED COUNT: 14.8 % (ref 10–15)
HCT VFR BLD AUTO: 25.8 % (ref 35–47)
HGB BLD-MCNC: 9 G/DL (ref 11.7–15.7)
IMM GRANULOCYTES # BLD: 0 10E3/UL
IMM GRANULOCYTES NFR BLD: 0 %
LYMPHOCYTES # BLD AUTO: 0.8 10E3/UL (ref 0.8–5.3)
LYMPHOCYTES NFR BLD AUTO: 9 %
MCH RBC QN AUTO: 33.2 PG (ref 26.5–33)
MCHC RBC AUTO-ENTMCNC: 34.9 G/DL (ref 31.5–36.5)
MCV RBC AUTO: 95 FL (ref 78–100)
MONOCYTES # BLD AUTO: 0.9 10E3/UL (ref 0–1.3)
MONOCYTES NFR BLD AUTO: 10 %
NEUTROPHILS # BLD AUTO: 6.8 10E3/UL (ref 1.6–8.3)
NEUTROPHILS NFR BLD AUTO: 76 %
PLATELET # BLD AUTO: 403 10E3/UL (ref 150–450)
RBC # BLD AUTO: 2.71 10E6/UL (ref 3.8–5.2)
WBC # BLD AUTO: 9 10E3/UL (ref 4–11)

## 2021-10-14 PROCEDURE — 85390 FIBRINOLYSINS SCREEN I&R: CPT | Performed by: PATHOLOGY

## 2021-10-14 PROCEDURE — 85245 CLOT FACTOR VIII VW RISTOCTN: CPT | Performed by: FAMILY MEDICINE

## 2021-10-14 PROCEDURE — 85246 CLOT FACTOR VIII VW ANTIGEN: CPT | Performed by: FAMILY MEDICINE

## 2021-10-14 PROCEDURE — 85240 CLOT FACTOR VIII AHG 1 STAGE: CPT | Performed by: FAMILY MEDICINE

## 2021-10-14 PROCEDURE — 85025 COMPLETE CBC W/AUTO DIFF WBC: CPT | Performed by: FAMILY MEDICINE

## 2021-10-14 PROCEDURE — 85390 FIBRINOLYSINS SCREEN I&R: CPT | Performed by: FAMILY MEDICINE

## 2021-10-14 PROCEDURE — 36415 COLL VENOUS BLD VENIPUNCTURE: CPT | Performed by: FAMILY MEDICINE

## 2021-10-14 PROCEDURE — 85730 THROMBOPLASTIN TIME PARTIAL: CPT | Performed by: FAMILY MEDICINE

## 2021-10-15 ENCOUNTER — MEDICAL CORRESPONDENCE (OUTPATIENT)
Dept: HEALTH INFORMATION MANAGEMENT | Facility: CLINIC | Age: 56
End: 2021-10-15

## 2021-10-15 DIAGNOSIS — D62 ANEMIA DUE TO BLOOD LOSS, ACUTE: ICD-10-CM

## 2021-10-15 DIAGNOSIS — Z01.818 PREOP GENERAL PHYSICAL EXAM: Primary | ICD-10-CM

## 2021-10-18 ENCOUNTER — LAB (OUTPATIENT)
Dept: LAB | Facility: CLINIC | Age: 56
End: 2021-10-18
Payer: COMMERCIAL

## 2021-10-18 ENCOUNTER — TRANSFERRED RECORDS (OUTPATIENT)
Dept: HEALTH INFORMATION MANAGEMENT | Facility: CLINIC | Age: 56
End: 2021-10-18

## 2021-10-18 DIAGNOSIS — D64.9 ANEMIA: Primary | ICD-10-CM

## 2021-10-18 LAB
FACT VIII ACT/NOR PPP: 315 % (ref 55–200)
HEMOCCULT SP1 STL QL: NEGATIVE
HEMOCCULT SP2 STL QL: NEGATIVE
HEMOCCULT SP3 STL QL: NEGATIVE
VON WILLEBRAND EVAL PPP-IMP: NORMAL
VWF AG ACT/NOR PPP IA: 249 % (ref 50–200)
VWF:AC ACT/NOR PPP IA: 299 % (ref 50–180)

## 2021-10-18 PROCEDURE — 82274 ASSAY TEST FOR BLOOD FECAL: CPT

## 2021-10-19 ENCOUNTER — TRANSFERRED RECORDS (OUTPATIENT)
Dept: HEALTH INFORMATION MANAGEMENT | Facility: CLINIC | Age: 56
End: 2021-10-19

## 2021-10-20 ENCOUNTER — MYC MEDICAL ADVICE (OUTPATIENT)
Dept: FAMILY MEDICINE | Facility: CLINIC | Age: 56
End: 2021-10-20

## 2021-10-20 NOTE — TELEPHONE ENCOUNTER
Hi Ignacia -  I do not see any activity on your chart for yesterday 10/19.... is your surgery re-scheduled? I'll keep looking, in case it is today. I'm just not seeing much as far as a change of plans. I sincerely hope you are doing well, whether in recovery or in preparation.  CHUCK Torres

## 2021-10-21 LAB
CREATININE (EXTERNAL): 0.54 MG/DL (ref 0.57–1.11)
GFR ESTIMATED (EXTERNAL): >60 ML/MIN/1.73M2
GFR ESTIMATED (IF AFRICAN AMERICAN) (EXTERNAL): >60 ML/MIN/1.73M2
GLUCOSE (EXTERNAL): 115 MG/DL (ref 65–100)
POTASSIUM (EXTERNAL): 4.9 MMOL/L (ref 3.5–5)

## 2021-10-22 LAB
CREATININE (EXTERNAL): 0.54 MG/DL (ref 0.57–1.11)
GFR ESTIMATED (EXTERNAL): >60 ML/MIN/1.73M2
GFR ESTIMATED (IF AFRICAN AMERICAN) (EXTERNAL): >60 ML/MIN/1.73M2
GLUCOSE (EXTERNAL): 96 MG/DL (ref 65–100)
POTASSIUM (EXTERNAL): 4.2 MMOL/L (ref 3.5–5)

## 2021-10-27 ENCOUNTER — OFFICE VISIT (OUTPATIENT)
Dept: FAMILY MEDICINE | Facility: CLINIC | Age: 56
End: 2021-10-27
Payer: COMMERCIAL

## 2021-10-27 VITALS
TEMPERATURE: 98 F | SYSTOLIC BLOOD PRESSURE: 98 MMHG | RESPIRATION RATE: 16 BRPM | BODY MASS INDEX: 18.33 KG/M2 | WEIGHT: 103.5 LBS | OXYGEN SATURATION: 96 % | HEART RATE: 64 BPM | DIASTOLIC BLOOD PRESSURE: 66 MMHG

## 2021-10-27 DIAGNOSIS — M54.9 PAIN, UPPER BACK: ICD-10-CM

## 2021-10-27 DIAGNOSIS — D64.9 ANEMIA, UNSPECIFIED TYPE: Primary | ICD-10-CM

## 2021-10-27 DIAGNOSIS — D62 ANEMIA DUE TO BLOOD LOSS, ACUTE: ICD-10-CM

## 2021-10-27 DIAGNOSIS — M47.812 ARTHROPATHY OF CERVICAL FACET JOINT: ICD-10-CM

## 2021-10-27 DIAGNOSIS — N83.8 OVARIAN MASS: ICD-10-CM

## 2021-10-27 DIAGNOSIS — E87.1 LOW SODIUM LEVELS: ICD-10-CM

## 2021-10-27 DIAGNOSIS — F51.02 ADJUSTMENT INSOMNIA: ICD-10-CM

## 2021-10-27 DIAGNOSIS — G89.18 POST-OP PAIN: ICD-10-CM

## 2021-10-27 PROBLEM — M35.00 SICCA SYNDROME (H): Status: RESOLVED | Noted: 2017-11-15 | Resolved: 2021-10-27

## 2021-10-27 PROBLEM — N83.512 TORSION OF LEFT OVARY AND OVARIAN PEDICLE: Status: ACTIVE | Noted: 2021-10-21

## 2021-10-27 LAB
ANION GAP SERPL CALCULATED.3IONS-SCNC: 11 MMOL/L (ref 5–18)
BASOPHILS # BLD AUTO: 0 10E3/UL (ref 0–0.2)
BASOPHILS NFR BLD AUTO: 1 %
BUN SERPL-MCNC: 12 MG/DL (ref 8–22)
CALCIUM SERPL-MCNC: 8.8 MG/DL (ref 8.5–10.5)
CHLORIDE BLD-SCNC: 95 MMOL/L (ref 98–107)
CO2 SERPL-SCNC: 24 MMOL/L (ref 22–31)
CREAT SERPL-MCNC: 0.62 MG/DL (ref 0.6–1.1)
EOSINOPHIL # BLD AUTO: 0.5 10E3/UL (ref 0–0.7)
EOSINOPHIL NFR BLD AUTO: 7 %
ERYTHROCYTE [DISTWIDTH] IN BLOOD BY AUTOMATED COUNT: 15.3 % (ref 10–15)
GFR SERPL CREATININE-BSD FRML MDRD: >90 ML/MIN/1.73M2
GLUCOSE BLD-MCNC: 85 MG/DL (ref 70–125)
HCT VFR BLD AUTO: 26.1 % (ref 35–47)
HGB BLD-MCNC: 8.8 G/DL (ref 11.7–15.7)
IMM GRANULOCYTES # BLD: 0.1 10E3/UL
IMM GRANULOCYTES NFR BLD: 1 %
LYMPHOCYTES # BLD AUTO: 1 10E3/UL (ref 0.8–5.3)
LYMPHOCYTES NFR BLD AUTO: 13 %
MCH RBC QN AUTO: 32 PG (ref 26.5–33)
MCHC RBC AUTO-ENTMCNC: 33.7 G/DL (ref 31.5–36.5)
MCV RBC AUTO: 95 FL (ref 78–100)
MONOCYTES # BLD AUTO: 0.5 10E3/UL (ref 0–1.3)
MONOCYTES NFR BLD AUTO: 6 %
NEUTROPHILS # BLD AUTO: 5.1 10E3/UL (ref 1.6–8.3)
NEUTROPHILS NFR BLD AUTO: 72 %
NRBC # BLD AUTO: 0 10E3/UL
NRBC BLD AUTO-RTO: 0 /100
PLATELET # BLD AUTO: 712 10E3/UL (ref 150–450)
POTASSIUM BLD-SCNC: 4.4 MMOL/L (ref 3.5–5)
RBC # BLD AUTO: 2.75 10E6/UL (ref 3.8–5.2)
SODIUM SERPL-SCNC: 130 MMOL/L (ref 136–145)
WBC # BLD AUTO: 7.1 10E3/UL (ref 4–11)

## 2021-10-27 PROCEDURE — 99215 OFFICE O/P EST HI 40 MIN: CPT | Performed by: FAMILY MEDICINE

## 2021-10-27 PROCEDURE — 36415 COLL VENOUS BLD VENIPUNCTURE: CPT | Performed by: FAMILY MEDICINE

## 2021-10-27 PROCEDURE — 85025 COMPLETE CBC W/AUTO DIFF WBC: CPT | Performed by: FAMILY MEDICINE

## 2021-10-27 PROCEDURE — 80048 BASIC METABOLIC PNL TOTAL CA: CPT | Performed by: FAMILY MEDICINE

## 2021-10-27 RX ORDER — PREGABALIN 100 MG/1
200 CAPSULE ORAL
COMMUNITY
End: 2021-10-27

## 2021-10-27 RX ORDER — PREGABALIN 100 MG/1
100 CAPSULE ORAL 3 TIMES DAILY
Qty: 270 CAPSULE | Refills: 3 | Status: SHIPPED | OUTPATIENT
Start: 2021-10-27 | End: 2022-05-31

## 2021-10-28 PROBLEM — N83.512 TORSION OF LEFT OVARY AND OVARIAN PEDICLE: Status: RESOLVED | Noted: 2021-10-21 | Resolved: 2021-10-28

## 2021-10-28 NOTE — PROGRESS NOTES
Assessment & Plan     Anemia  On iron which she will take daily along with getting some meat. She understands about avoiding milk at the same time in order to maximize absorption. Hgb today is 8.8 but for her to work, this needs to increase quickly. She's doing a bit of exercise to stimulate more demand, too.    Ovarian mass  Removed - it was large and showed a lot of necrosis due to torsion. It is terrific it is now out and it is great that it is benign.  - CBC with platelets and differential    Post-op pain  Managed with OTC meds. She says this is adequate. pregabalin is ordered not for this but for her shoulder/neck pain which is work-related.  - CBC with platelets and differential    Adjustment insomnia  This should improve with more healing. I've asked her to sleep more - even take a short nape during the day.  - CBC with platelets and differential    Anemia due to blood loss, acute  As above  - CBC with platelets and differential    Low sodium levels  Check and follow low sodium. I hope this normalizes soon.  - Basic metabolic panel  (Ca, Cl, CO2, Creat, Gluc, K, Na, BUN)  - Basic metabolic panel  (Ca, Cl, CO2, Creat, Gluc, K, Na, BUN)    Arthropathy of cervical facet joint  She requests meds to [facility] and I've written for 3 month supply.  - pregabalin (LYRICA) 100 MG capsule  Dispense: 270 capsule; Refill: 3    Pain, upper back  As above  - pregabalin (LYRICA) 100 MG capsule  Dispense: 270 capsule; Refill: 3      Review of external notes as documented elsewhere in note  Ordering of each unique test  Prescription drug management  40 minutes spent on the date of the encounter doing chart review, history and exam, documentation and further activities per the note         Return in about 1 month (around 11/27/2021) for Follow up.    Ritika Torres MD  [facility] YUNG Freeman is a 56 year old who presents for the following health issues  accompanied by her  "mother.    HPI   Ignacia was able to walk in for her appointment today. She says she's thankful to be doing well overall. Her mom notes the incision is very long, yet Ignacia notes no redness or drainage. Ignacia says she usually wears an abdominal binder and that significantly helps her pain.    Her experience in the hospital was terrible. If it weren't for her mom, she'd have had no assistance to the bathroom. Nurses were only available to dispense meds, otherwise they were all on computers. She said every person who came in her room was complaining about their work/workload.    Work - somehow, she is only able to have 2 weeks off work for major abd surgery. Her work is \"willing to put her on lighter rotations\" for a few weeks, but there is no option to be off for 6 weeks. Ignacia feels like it is mandatory for her to try to do this. She says if she takes off more time, then over the next year (or possibly two years), she'd have to \"pay back\" the extra time off.    Urination and bowel movements are fine. She's continuing with stool softener to keep her stools soft.    Her appetite is OK. She did not realize how low her weight had gotten.    Sleep is on/off yet. Ignacia is only taking acetaminophen and ibuprofen for pain.    She wonders why her sodium level has been low? She is aware of her anemia. She is taking ferrous sulfate for that.    Review of Systems   No rash      Objective    BP 98/66   Pulse 64   Temp 98  F (36.7  C) (Oral)   Resp 16   Wt 46.9 kg (103 lb 8 oz)   SpO2 96%   BMI 18.33 kg/m    Body mass index is 18.33 kg/m .  Physical Exam   Gen: tired but OK, conversant, moves carefully  Abd: incision from epigastric area to the pubic bone, all covered with steri-strips; underneath the strips is some dried blood but nothing new/wet and no pus/other. I pressed lightly and she had no pain.    Mood: fair/serious  Affect: tired          "

## 2021-10-29 ENCOUNTER — MYC MEDICAL ADVICE (OUTPATIENT)
Dept: FAMILY MEDICINE | Facility: CLINIC | Age: 56
End: 2021-10-29

## 2021-10-29 NOTE — TELEPHONE ENCOUNTER
"The CT is COVERED!!!!    Hi Ignacia -  I thought I'd call in to see if I could learn any info to help our appeal for the CT to be covered. I said I wanted to discuss with another physician why this was not covered, she looked it up, and said it had already been re-reviewed and then approved!!! The approval number is [id number]. Thus, there was no reason to discuss the matter. She did note that this was \"no guarantee of payment\" when she rattled off other details, but I think you are in the clear on this. I am so thankful this is one less thing to worry about or even just to \"do\".  CHUCK Torres  "

## 2021-10-29 NOTE — TELEPHONE ENCOUNTER
Hi Ignacia -  I hope you continue to do well in recovery.  When I write for DME (eg your abd binder), we usually fax it to a medical supply store to fill the order. What medical supply store would you like to use? [facility] is one we use a lot. I believe it is on [facility]. In [place]. We can use any, however. Let me know and then we'll fax in the order.  Thanks  HCUCK Torres

## 2021-11-01 ENCOUNTER — TRANSFERRED RECORDS (OUTPATIENT)
Dept: HEALTH INFORMATION MANAGEMENT | Facility: CLINIC | Age: 56
End: 2021-11-01

## 2021-11-03 ENCOUNTER — MEDICAL CORRESPONDENCE (OUTPATIENT)
Dept: FAMILY MEDICINE | Facility: CLINIC | Age: 56
End: 2021-11-03

## 2021-11-03 ENCOUNTER — MYC MEDICAL ADVICE (OUTPATIENT)
Dept: FAMILY MEDICINE | Facility: CLINIC | Age: 56
End: 2021-11-03

## 2021-11-03 NOTE — TELEPHONE ENCOUNTER
"Hi Ignacia -    I bet you are tired, working now. Still, I am writing to see how you are doing? Let me know if you have a chance to see this and reply.    Also, I see your mom comes 12/8 and you come 12/14 -- is it helpful to have your visits separate or would you like them \"back to back\" on the same day? I could watch for any openings so you both could be seen on the same day if you want that.  "

## 2021-11-22 ENCOUNTER — MYC MEDICAL ADVICE (OUTPATIENT)
Dept: FAMILY MEDICINE | Facility: CLINIC | Age: 56
End: 2021-11-22
Payer: COMMERCIAL

## 2021-11-22 DIAGNOSIS — M47.812 ARTHROPATHY OF CERVICAL FACET JOINT: Primary | ICD-10-CM

## 2021-11-22 DIAGNOSIS — M54.2 NECK PAIN: ICD-10-CM

## 2021-11-23 NOTE — TELEPHONE ENCOUNTER
Dr. Torres,    Please see below [facility] message requesting referral and advise.    Thank you!    Maria De Jesus GARNETT RN

## 2021-11-26 ENCOUNTER — TRANSFERRED RECORDS (OUTPATIENT)
Dept: HEALTH INFORMATION MANAGEMENT | Facility: CLINIC | Age: 56
End: 2021-11-26
Payer: COMMERCIAL

## 2021-12-08 DIAGNOSIS — F33.8 SEASONAL AFFECTIVE DISORDER (H): Primary | ICD-10-CM

## 2021-12-08 RX ORDER — ESCITALOPRAM OXALATE 5 MG/1
5 TABLET ORAL DAILY
Qty: 90 TABLET | Refills: 4 | Status: SHIPPED | OUTPATIENT
Start: 2021-12-08 | End: 2022-05-31 | Stop reason: ALTCHOICE

## 2022-02-02 ENCOUNTER — TRANSFERRED RECORDS (OUTPATIENT)
Dept: HEALTH INFORMATION MANAGEMENT | Facility: CLINIC | Age: 57
End: 2022-02-02
Payer: COMMERCIAL

## 2022-02-22 ENCOUNTER — TRANSFERRED RECORDS (OUTPATIENT)
Dept: HEALTH INFORMATION MANAGEMENT | Facility: CLINIC | Age: 57
End: 2022-02-22
Payer: COMMERCIAL

## 2022-02-28 ENCOUNTER — TELEPHONE (OUTPATIENT)
Dept: MAMMOGRAPHY | Facility: CLINIC | Age: 57
End: 2022-02-28
Payer: COMMERCIAL

## 2022-02-28 NOTE — TELEPHONE ENCOUNTER
Patient Quality Outreach    Patient is due for the following:   Breast Cancer Screening - Mammogram    NEXT STEPS:   Schedule a office visit for mammogram    Type of outreach:    Phone, left message for patient/parent to call back.    Next Steps:  Reach out within 120 days via Phone.    Max number of attempts reached: No. Will try again in 120 days if patient still on fail list.    Questions for provider review:    None     JULIO Leblanc  Chart routed to Care Team.

## 2022-05-30 ASSESSMENT — PATIENT HEALTH QUESTIONNAIRE - PHQ9
SUM OF ALL RESPONSES TO PHQ QUESTIONS 1-9: 0
SUM OF ALL RESPONSES TO PHQ QUESTIONS 1-9: 0

## 2022-05-31 ENCOUNTER — OFFICE VISIT (OUTPATIENT)
Dept: FAMILY MEDICINE | Facility: CLINIC | Age: 57
End: 2022-05-31
Payer: COMMERCIAL

## 2022-05-31 VITALS
OXYGEN SATURATION: 99 % | SYSTOLIC BLOOD PRESSURE: 90 MMHG | BODY MASS INDEX: 18.38 KG/M2 | HEIGHT: 63 IN | WEIGHT: 103.75 LBS | TEMPERATURE: 97.5 F | HEART RATE: 51 BPM | DIASTOLIC BLOOD PRESSURE: 60 MMHG

## 2022-05-31 DIAGNOSIS — Z00.00 PREVENTATIVE HEALTH CARE: ICD-10-CM

## 2022-05-31 DIAGNOSIS — D64.9 ANEMIA, UNSPECIFIED TYPE: Primary | ICD-10-CM

## 2022-05-31 DIAGNOSIS — F32.2 CURRENT SEVERE EPISODE OF MAJOR DEPRESSIVE DISORDER WITHOUT PSYCHOTIC FEATURES WITHOUT PRIOR EPISODE (H): ICD-10-CM

## 2022-05-31 DIAGNOSIS — E78.5 HYPERLIPIDEMIA LDL GOAL <130: ICD-10-CM

## 2022-05-31 DIAGNOSIS — R14.0 ABDOMINAL BLOATING: ICD-10-CM

## 2022-05-31 DIAGNOSIS — M54.9 PAIN, UPPER BACK: ICD-10-CM

## 2022-05-31 DIAGNOSIS — E87.1 LOW SODIUM LEVELS: ICD-10-CM

## 2022-05-31 DIAGNOSIS — K59.01 SLOW TRANSIT CONSTIPATION: ICD-10-CM

## 2022-05-31 DIAGNOSIS — M47.812 ARTHROPATHY OF CERVICAL FACET JOINT: ICD-10-CM

## 2022-05-31 LAB
ALBUMIN UR-MCNC: NEGATIVE MG/DL
ANION GAP SERPL CALCULATED.3IONS-SCNC: 11 MMOL/L (ref 5–18)
APPEARANCE UR: CLEAR
BASOPHILS # BLD AUTO: 0.1 10E3/UL (ref 0–0.2)
BASOPHILS NFR BLD AUTO: 1 %
BILIRUB UR QL STRIP: NEGATIVE
BUN SERPL-MCNC: 13 MG/DL (ref 8–22)
CALCIUM SERPL-MCNC: 9.3 MG/DL (ref 8.5–10.5)
CHLORIDE BLD-SCNC: 98 MMOL/L (ref 98–107)
CO2 SERPL-SCNC: 25 MMOL/L (ref 22–31)
COLOR UR AUTO: NORMAL
CREAT SERPL-MCNC: 0.76 MG/DL (ref 0.6–1.1)
EOSINOPHIL # BLD AUTO: 0.1 10E3/UL (ref 0–0.7)
EOSINOPHIL NFR BLD AUTO: 1 %
ERYTHROCYTE [DISTWIDTH] IN BLOOD BY AUTOMATED COUNT: 15.3 % (ref 10–15)
FERRITIN SERPL-MCNC: 40 NG/ML (ref 10–130)
GFR SERPL CREATININE-BSD FRML MDRD: >90 ML/MIN/1.73M2
GLUCOSE BLD-MCNC: 90 MG/DL (ref 70–125)
GLUCOSE UR STRIP-MCNC: NEGATIVE MG/DL
HCT VFR BLD AUTO: 39.3 % (ref 35–47)
HGB BLD-MCNC: 13.2 G/DL (ref 11.7–15.7)
HGB UR QL STRIP: NEGATIVE
IMM GRANULOCYTES # BLD: 0 10E3/UL
IMM GRANULOCYTES NFR BLD: 0 %
KETONES UR STRIP-MCNC: NEGATIVE MG/DL
LEUKOCYTE ESTERASE UR QL STRIP: NEGATIVE
LYMPHOCYTES # BLD AUTO: 0.5 10E3/UL (ref 0.8–5.3)
LYMPHOCYTES NFR BLD AUTO: 10 %
MCH RBC QN AUTO: 33.2 PG (ref 26.5–33)
MCHC RBC AUTO-ENTMCNC: 33.6 G/DL (ref 31.5–36.5)
MCV RBC AUTO: 99 FL (ref 78–100)
MONOCYTES # BLD AUTO: 0.4 10E3/UL (ref 0–1.3)
MONOCYTES NFR BLD AUTO: 8 %
NEUTROPHILS # BLD AUTO: 3.7 10E3/UL (ref 1.6–8.3)
NEUTROPHILS NFR BLD AUTO: 79 %
NITRATE UR QL: NEGATIVE
PH UR STRIP: 7 [PH] (ref 5–7)
PLATELET # BLD AUTO: 198 10E3/UL (ref 150–450)
POTASSIUM BLD-SCNC: 4.2 MMOL/L (ref 3.5–5)
RBC # BLD AUTO: 3.98 10E6/UL (ref 3.8–5.2)
RETICS # AUTO: 0.05 10E6/UL (ref 0.01–0.11)
RETICS/RBC NFR AUTO: 1.2 % (ref 0.8–2.7)
SODIUM SERPL-SCNC: 134 MMOL/L (ref 136–145)
SP GR UR STRIP: 1.01 (ref 1–1.03)
UROBILINOGEN UR STRIP-ACNC: 0.2 E.U./DL
WBC # BLD AUTO: 4.6 10E3/UL (ref 4–11)

## 2022-05-31 PROCEDURE — 84466 ASSAY OF TRANSFERRIN: CPT | Performed by: FAMILY MEDICINE

## 2022-05-31 PROCEDURE — 82728 ASSAY OF FERRITIN: CPT | Performed by: FAMILY MEDICINE

## 2022-05-31 PROCEDURE — 81003 URINALYSIS AUTO W/O SCOPE: CPT | Performed by: FAMILY MEDICINE

## 2022-05-31 PROCEDURE — 85025 COMPLETE CBC W/AUTO DIFF WBC: CPT | Performed by: FAMILY MEDICINE

## 2022-05-31 PROCEDURE — 83550 IRON BINDING TEST: CPT | Performed by: FAMILY MEDICINE

## 2022-05-31 PROCEDURE — 80048 BASIC METABOLIC PNL TOTAL CA: CPT | Performed by: FAMILY MEDICINE

## 2022-05-31 PROCEDURE — 99215 OFFICE O/P EST HI 40 MIN: CPT | Performed by: FAMILY MEDICINE

## 2022-05-31 PROCEDURE — 85045 AUTOMATED RETICULOCYTE COUNT: CPT | Performed by: FAMILY MEDICINE

## 2022-05-31 PROCEDURE — 36415 COLL VENOUS BLD VENIPUNCTURE: CPT | Performed by: FAMILY MEDICINE

## 2022-05-31 PROCEDURE — 96127 BRIEF EMOTIONAL/BEHAV ASSMT: CPT | Performed by: FAMILY MEDICINE

## 2022-05-31 RX ORDER — PREGABALIN 100 MG/1
100 CAPSULE ORAL 3 TIMES DAILY
Qty: 270 CAPSULE | Refills: 3 | Status: CANCELLED | OUTPATIENT
Start: 2022-05-31

## 2022-05-31 RX ORDER — PREGABALIN 100 MG/1
100 CAPSULE ORAL 3 TIMES DAILY
Qty: 270 CAPSULE | Refills: 3 | Status: SHIPPED | OUTPATIENT
Start: 2022-05-31 | End: 2022-12-13

## 2022-05-31 RX ORDER — ROSUVASTATIN CALCIUM 10 MG/1
10 TABLET, COATED ORAL AT BEDTIME
Qty: 90 TABLET | Refills: 4 | Status: SHIPPED | OUTPATIENT
Start: 2022-05-31 | End: 2022-12-14

## 2022-05-31 ASSESSMENT — PATIENT HEALTH QUESTIONNAIRE - PHQ9: SUM OF ALL RESPONSES TO PHQ QUESTIONS 1-9: 0

## 2022-05-31 NOTE — PROGRESS NOTES
Assessment & Plan     Anemia, unspecified type  This is markedly improved!  - CBC with platelets and differential  - Ferritin  - Reticulocyte count  - CBC with platelets and differential  - Ferritin  - Reticulocyte count  - Transferrin  - Iron and iron binding capacity  - Transferrin  - Iron and iron binding capacity    Low sodium levels  This is better then her usual, but is still slightly low - need to continue to monitor  - Basic metabolic panel  (Ca, Cl, CO2, Creat, Gluc, K, Na, BUN)  - UA Macro with Reflex to Micro and Culture - lab collect  - Basic metabolic panel  (Ca, Cl, CO2, Creat, Gluc, K, Na, BUN)  - UA Macro with Reflex to Micro and Culture - lab collect    Arthropathy of cervical facet joint  I'll refill pain meds for this, as the pain has been worse. I do not worry about her overusing this med, I worry about her under-using it and suffering in pain  - pregabalin (LYRICA) 100 MG capsule  Dispense: 270 capsule; Refill: 3    Pain, upper back  As above  - pregabalin (LYRICA) 100 MG capsule  Dispense: 270 capsule; Refill: 3    Hyperlipidemia LDL goal <130  Continue same  - rosuvastatin (CRESTOR) 10 MG tablet  Dispense: 90 tablet; Refill: 4    Preventative health care  reviewed  - REVIEW OF HEALTH MAINTENANCE PROTOCOL ORDERS    Abdominal bloating  No masses felt. Colonoscopy needed at this point  - CT Abdomen Pelvis w Contrast  - Adult Gastro Ref - Procedure Only    Slow transit constipation  Pursue CT and colonoscopy to better evaluate this. I'm wondering if she has Celiac or something like that at this point  - CT Abdomen Pelvis w Contrast  - Adult Gastro Ref - Procedure Only    Current severe episode of major depressive disorder without psychotic features without prior episode (H)  Computer suggested this diagnosis. I do not believe she has severe depression, but certainly her stress is increased and mild-moderate is appropriate.      Review of external notes as documented elsewhere in note  Ordering  "of each unique test  Prescription drug management  40 minutes spent on the date of the encounter doing chart review, history and exam, documentation and further activities per the note    No follow-ups on file.    Ritika Torres MD  [facility] YUNG Freeman is a 56 year old who presents for the following health issues  accompanied by her mother.    History of Present Illness       Reason for visit:  Follow up    She eats 4 or more servings of fruits and vegetables daily.She consumes 0 sweetened beverage(s) daily.She exercises with enough effort to increase her heart rate 30 to 60 minutes per day.  She exercises with enough effort to increase her heart rate 4 days per week.   She is taking medications regularly.    Today's PHQ-9         PHQ-9 Total Score: 0    PHQ-9 Q9 Thoughts of better off dead/self-harm past 2 weeks :   Not at all         Feels bloated - nightly  Worse constipation despite more miralax  1 inch bigger girth  Better in AM    Tried a varied diet  Tried FODMAP - no difference    Poop - never normal; feels like can't get enough pressure  Either diarrhea or thin/narrow poops    Review of Systems   No rash      Objective    BP 90/60 (BP Location: Right arm, Patient Position: Sitting, Cuff Size: Adult Small)   Pulse 51   Temp 97.5  F (36.4  C) (Oral)   Ht 1.6 m (5' 3\")   Wt 47.1 kg (103 lb 12 oz)   SpO2 99%   BMI 18.38 kg/m    Body mass index is 18.38 kg/m .  Physical Exam   GENERAL: alert, mild emotional distress (due to mom's health), frail and fatigued  NECK: no adenopathy, no asymmetry, masses, or scars and thyroid normal to palpation  ABDOMEN: bowel sounds normal, well-healed incision vertically on abd, and with deep palpation no masses felt, mild bloating found with mild tenderness, neg psoas sign  MS: upper back and neck muscles are tense  PSYCH: mentation appears normal, judgement and insight intact, appearance well groomed but she is clearly " concerned about her mom    Results for orders placed or performed in visit on 05/31/22   Ferritin     Status: Normal   Result Value Ref Range    Ferritin 40 10 - 130 ng/mL   Basic metabolic panel  (Ca, Cl, CO2, Creat, Gluc, K, Na, BUN)     Status: Abnormal   Result Value Ref Range    Sodium 134 (L) 136 - 145 mmol/L    Potassium 4.2 3.5 - 5.0 mmol/L    Chloride 98 98 - 107 mmol/L    Carbon Dioxide (CO2) 25 22 - 31 mmol/L    Anion Gap 11 5 - 18 mmol/L    Urea Nitrogen 13 8 - 22 mg/dL    Creatinine 0.76 0.60 - 1.10 mg/dL    Calcium 9.3 8.5 - 10.5 mg/dL    Glucose 90 70 - 125 mg/dL    GFR Estimate >90 >60 mL/min/1.73m2   Reticulocyte count     Status: Normal   Result Value Ref Range    % Reticulocyte 1.2 0.8 - 2.7 %    Absolute Reticulocyte 0.046 0.010 - 0.110 10e6/uL   CBC with platelets and differential     Status: Abnormal   Result Value Ref Range    WBC Count 4.6 4.0 - 11.0 10e3/uL    RBC Count 3.98 3.80 - 5.20 10e6/uL    Hemoglobin 13.2 11.7 - 15.7 g/dL    Hematocrit 39.3 35.0 - 47.0 %    MCV 99 78 - 100 fL    MCH 33.2 (H) 26.5 - 33.0 pg    MCHC 33.6 31.5 - 36.5 g/dL    RDW 15.3 (H) 10.0 - 15.0 %    Platelet Count 198 150 - 450 10e3/uL    % Neutrophils 79 %    % Lymphocytes 10 %    % Monocytes 8 %    % Eosinophils 1 %    % Basophils 1 %    % Immature Granulocytes 0 %    Absolute Neutrophils 3.7 1.6 - 8.3 10e3/uL    Absolute Lymphocytes 0.5 (L) 0.8 - 5.3 10e3/uL    Absolute Monocytes 0.4 0.0 - 1.3 10e3/uL    Absolute Eosinophils 0.1 0.0 - 0.7 10e3/uL    Absolute Basophils 0.1 0.0 - 0.2 10e3/uL    Absolute Immature Granulocytes 0.0 <=0.4 10e3/uL   UA Macro with Reflex to Micro and Culture - lab collect     Status: Normal    Specimen: Urine, NOS   Result Value Ref Range    Color Urine Light Yellow Colorless, Straw, Light Yellow, Yellow    Appearance Urine Clear Clear    Glucose Urine Negative Negative mg/dL    Bilirubin Urine Negative Negative    Ketones Urine Negative Negative mg/dL    Specific Gravity Urine 1.010  1.005 - 1.030    Blood Urine Negative Negative    pH Urine 7.0 5.0 - 7.0    Protein Albumin Urine Negative Negative mg/dL    Urobilinogen Urine 0.2 0.2, 1.0 E.U./dL    Nitrite Urine Negative Negative    Leukocyte Esterase Urine Negative Negative    Narrative    Microscopic not indicated   Transferrin     Status: Normal   Result Value Ref Range    Transferrin 250 212 - 360 mg/dL   Iron and iron binding capacity     Status: Abnormal   Result Value Ref Range    Iron 34 (L) 35 - 180 ug/dL    Iron Binding Capacity 347 240 - 430 ug/dL    Iron Sat Index 10 (L) 15 - 46 %   CBC with platelets and differential     Status: Abnormal    Narrative    The following orders were created for panel order CBC with platelets and differential.  Procedure                               Abnormality         Status                     ---------                               -----------         ------                     CBC with platelets and d...[[id number]]  Abnormal            Final result                 Please view results for these tests on the individual orders.

## 2022-06-01 ENCOUNTER — TRANSFERRED RECORDS (OUTPATIENT)
Dept: HEALTH INFORMATION MANAGEMENT | Facility: CLINIC | Age: 57
End: 2022-06-01
Payer: COMMERCIAL

## 2022-06-01 LAB
IRON SATN MFR SERPL: 10 % (ref 15–46)
IRON SERPL-MCNC: 34 UG/DL (ref 35–180)
TIBC SERPL-MCNC: 347 UG/DL (ref 240–430)
TRANSFERRIN SERPL-MCNC: 250 MG/DL (ref 212–360)

## 2022-06-12 ENCOUNTER — HEALTH MAINTENANCE LETTER (OUTPATIENT)
Age: 57
End: 2022-06-12

## 2022-07-13 ENCOUNTER — TRANSFERRED RECORDS (OUTPATIENT)
Dept: HEALTH INFORMATION MANAGEMENT | Facility: CLINIC | Age: 57
End: 2022-07-13

## 2022-07-13 LAB
ALT SERPL-CCNC: 61 IU/L (ref 0–32)
AST SERPL-CCNC: 34 IU/L (ref 0–40)
CREATININE (EXTERNAL): 0.81 MG/DL (ref 0.57–1)
GFR ESTIMATED (EXTERNAL): 85 ML/MIN/1.73
GLUCOSE (EXTERNAL): 93 MG/DL (ref 65–99)
POTASSIUM (EXTERNAL): 4.7 MMOL/L (ref 3.5–5.2)
TSH SERPL-ACNC: 3.39 UIU/ML (ref 0.45–4.5)

## 2022-07-14 ENCOUNTER — TELEPHONE (OUTPATIENT)
Dept: MAMMOGRAPHY | Facility: CLINIC | Age: 57
End: 2022-07-14

## 2022-07-18 ENCOUNTER — TRANSFERRED RECORDS (OUTPATIENT)
Dept: HEALTH INFORMATION MANAGEMENT | Facility: CLINIC | Age: 57
End: 2022-07-18

## 2022-08-31 ENCOUNTER — TELEPHONE (OUTPATIENT)
Dept: MAMMOGRAPHY | Facility: CLINIC | Age: 57
End: 2022-08-31

## 2022-08-31 NOTE — TELEPHONE ENCOUNTER
Patient Quality Outreach    Patient is due for the following:   Breast Cancer Screening - Mammogram    Next Steps:   Schedule a office visit for mammogram    Type of outreach:    Phone, left message for patient/parent to call back.  3rd call    Next Steps:  Reach out within 90 days via Phone.    Max number of attempts reached: Yes. Will try again in 90 days if patient still on fail list.    Questions for provider review:    None     JULIO Leblanc  Chart routed to Care Team.

## 2022-09-20 ENCOUNTER — ALLIED HEALTH/NURSE VISIT (OUTPATIENT)
Dept: FAMILY MEDICINE | Facility: CLINIC | Age: 57
End: 2022-09-20

## 2022-09-20 ENCOUNTER — IMMUNIZATION (OUTPATIENT)
Dept: FAMILY MEDICINE | Facility: CLINIC | Age: 57
End: 2022-09-20
Payer: COMMERCIAL

## 2022-09-20 DIAGNOSIS — Z23 ENCOUNTER FOR IMMUNIZATION: Primary | ICD-10-CM

## 2022-09-20 PROCEDURE — 0124A COVID-19,PF,PFIZER BOOSTER BIVALENT: CPT

## 2022-09-20 PROCEDURE — 99207 PR NO CHARGE NURSE ONLY: CPT

## 2022-09-20 PROCEDURE — 90471 IMMUNIZATION ADMIN: CPT

## 2022-09-20 PROCEDURE — 91312 COVID-19,PF,PFIZER BOOSTER BIVALENT: CPT

## 2022-09-20 PROCEDURE — 90682 RIV4 VACC RECOMBINANT DNA IM: CPT

## 2022-10-24 ENCOUNTER — TRANSFERRED RECORDS (OUTPATIENT)
Dept: HEALTH INFORMATION MANAGEMENT | Facility: CLINIC | Age: 57
End: 2022-10-24

## 2022-12-13 ENCOUNTER — OFFICE VISIT (OUTPATIENT)
Dept: FAMILY MEDICINE | Facility: CLINIC | Age: 57
End: 2022-12-13
Payer: COMMERCIAL

## 2022-12-13 VITALS
BODY MASS INDEX: 17.84 KG/M2 | TEMPERATURE: 97.7 F | HEIGHT: 64 IN | RESPIRATION RATE: 12 BRPM | DIASTOLIC BLOOD PRESSURE: 70 MMHG | HEART RATE: 53 BPM | WEIGHT: 104.5 LBS | OXYGEN SATURATION: 100 % | SYSTOLIC BLOOD PRESSURE: 107 MMHG

## 2022-12-13 DIAGNOSIS — M54.9 PAIN, UPPER BACK: ICD-10-CM

## 2022-12-13 DIAGNOSIS — M54.2 NECK PAIN: Primary | ICD-10-CM

## 2022-12-13 DIAGNOSIS — F32.2 CURRENT SEVERE EPISODE OF MAJOR DEPRESSIVE DISORDER WITHOUT PSYCHOTIC FEATURES WITHOUT PRIOR EPISODE (H): ICD-10-CM

## 2022-12-13 DIAGNOSIS — R74.01 ELEVATED ALT MEASUREMENT: ICD-10-CM

## 2022-12-13 DIAGNOSIS — R14.0 ABDOMINAL BLOATING: ICD-10-CM

## 2022-12-13 DIAGNOSIS — E78.5 HYPERLIPIDEMIA LDL GOAL <130: ICD-10-CM

## 2022-12-13 LAB
ALBUMIN SERPL BCG-MCNC: 4.4 G/DL (ref 3.5–5.2)
ALP SERPL-CCNC: 36 U/L (ref 35–104)
ALT SERPL W P-5'-P-CCNC: 70 U/L (ref 10–35)
ANION GAP SERPL CALCULATED.3IONS-SCNC: 10 MMOL/L (ref 7–15)
AST SERPL W P-5'-P-CCNC: 41 U/L (ref 10–35)
BILIRUB SERPL-MCNC: 0.3 MG/DL
BUN SERPL-MCNC: 15.3 MG/DL (ref 6–20)
CALCIUM SERPL-MCNC: 9.2 MG/DL (ref 8.6–10)
CHLORIDE SERPL-SCNC: 95 MMOL/L (ref 98–107)
CHOLEST SERPL-MCNC: 243 MG/DL
CREAT SERPL-MCNC: 0.72 MG/DL (ref 0.51–0.95)
DEPRECATED HCO3 PLAS-SCNC: 24 MMOL/L (ref 22–29)
GFR SERPL CREATININE-BSD FRML MDRD: >90 ML/MIN/1.73M2
GLUCOSE SERPL-MCNC: 93 MG/DL (ref 70–99)
HDLC SERPL-MCNC: 114 MG/DL
LDLC SERPL CALC-MCNC: 118 MG/DL
NONHDLC SERPL-MCNC: 129 MG/DL
POTASSIUM SERPL-SCNC: 4.5 MMOL/L (ref 3.4–5.3)
PROT SERPL-MCNC: 6.5 G/DL (ref 6.4–8.3)
SODIUM SERPL-SCNC: 129 MMOL/L (ref 136–145)
TRIGL SERPL-MCNC: 57 MG/DL

## 2022-12-13 PROCEDURE — 80061 LIPID PANEL: CPT | Performed by: FAMILY MEDICINE

## 2022-12-13 PROCEDURE — 99215 OFFICE O/P EST HI 40 MIN: CPT | Performed by: FAMILY MEDICINE

## 2022-12-13 PROCEDURE — 96127 BRIEF EMOTIONAL/BEHAV ASSMT: CPT | Performed by: FAMILY MEDICINE

## 2022-12-13 PROCEDURE — 36415 COLL VENOUS BLD VENIPUNCTURE: CPT | Performed by: FAMILY MEDICINE

## 2022-12-13 PROCEDURE — 80053 COMPREHEN METABOLIC PANEL: CPT | Performed by: FAMILY MEDICINE

## 2022-12-13 RX ORDER — DULOXETIN HYDROCHLORIDE 30 MG/1
30 CAPSULE, DELAYED RELEASE ORAL 2 TIMES DAILY
Qty: 90 CAPSULE | Refills: 4 | Status: SHIPPED | OUTPATIENT
Start: 2022-12-13 | End: 2023-07-10

## 2022-12-13 ASSESSMENT — PATIENT HEALTH QUESTIONNAIRE - PHQ9
SUM OF ALL RESPONSES TO PHQ QUESTIONS 1-9: 0
10. IF YOU CHECKED OFF ANY PROBLEMS, HOW DIFFICULT HAVE THESE PROBLEMS MADE IT FOR YOU TO DO YOUR WORK, TAKE CARE OF THINGS AT HOME, OR GET ALONG WITH OTHER PEOPLE: NOT DIFFICULT AT ALL
SUM OF ALL RESPONSES TO PHQ QUESTIONS 1-9: 0

## 2022-12-13 NOTE — PROGRESS NOTES
Assessment & Plan     Neck pain  This is a very significant problem as her work continues to demand certain positioning that provokes more pain over hours, yet the work with the PT is promising for change, it will just take a long time. Ignacia is willing to persevere. I told her I am willing to prescribe pain meds if she desires them as I believe some pain meds to help her sleep well would be of benefit. She did not request any meds at this time. Cyclobenzaprine, hydrocodone or pregabalin are the meds I think would help the most.  - DULoxetine (CYMBALTA) 30 MG capsule  Dispense: 90 capsule; Refill: 4    Pain, upper back  The neck problem definitely extends into the upper back.    Hyperlipidemia LDL goal <130  Rechecking lipids with her on the statin. Results last time we checked were outstanding. This time the results are not as good - I did not ask if she had changed her dose at all.  - Lipid panel reflex to direct LDL Non-fasting  - Lipid panel reflex to direct LDL Non-fasting    Current severe episode of major depressive disorder without psychotic features without prior episode (H)  Ignacia admits this difficulty with her neck is frustrating, and requests a medication to help her mood through this. Duloxetine should work well as it was developed for coping with chronic pain. Try 30mg  - DULoxetine (CYMBALTA) 30 MG capsule  Dispense: 90 capsule; Refill: 4    Elevated ALT measurement  Rechecking as this was previously elevated  - Comprehensive metabolic panel (BMP + Alb, Alk Phos, ALT, AST, Total. Bili, TP)  - Comprehensive metabolic panel (BMP + Alb, Alk Phos, ALT, AST, Total. Bili, TP)    Constipation/bloating  This is partly managed. I asked Ignacia to take the meds regularly instead of prn. I think this problem is in part due to the stress from her neck pain, possibly made worse from 2021 abd surgey (oopherectomy) and further by lack of eating much quantity of vegetables/fiber. BMI 18.17. I hope this problem resolves,  because if it doesn't, additional testing might be needed.      Review of external notes as documented elsewhere in note  Ordering of each unique test  Prescription drug management  60 minutes spent on the date of the encounter doing chart review, history and exam, documentation and further activities per the note    Return in about 3 months (around 3/13/2023) for Follow up.    Ritika Torres MD  [facility] YUNG Freeman is a 57 year old accompanied by her mother, presenting for the following health issues:  neck pain      History of Present Illness       Reason for visit:  Constipation/bloating    She eats 2-3 servings of fruits and vegetables daily.She consumes 0 sweetened beverage(s) daily.She exercises with enough effort to increase her heart rate 30 to 60 minutes per day.  She exercises with enough effort to increase her heart rate 4 days per week.   She is taking medications regularly.    Today's PHQ-9         PHQ-9 Total Score: 0    PHQ-9 Q9 Thoughts of better off dead/self-harm past 2 weeks :   Not at all    How difficult have these problems made it for you to do your work, take care of things at home, or get along with other people: Not difficult at all     Ignacia continues to struggle with significant neck pain. She has tried a lot and perseveres with PT as she has a good therapist who is helping, but knows significant change will take a year or two of work. Yet, she has to keep working through this and the work continues to provoke pain and be very demanding.    Ignacia reminded me that when she had surgery a couple years ago, they did a pap smear, thus that is not due.    With the difficulties going on, Ignacia admits her mood is affected. She requests an antidepressant that might help her through this.    Constipation/bloating - she is treating this as needed and has some improvement.    Review of Systems         Objective    /70   Pulse 53   Temp 97.7  F (36.5  C)  "(Oral)   Resp 12   Ht 1.615 m (5' 3.58\")   Wt 47.4 kg (104 lb 8 oz)   SpO2 100%   BMI 18.17 kg/m    Body mass index is 18.17 kg/m .  Physical Exam  GENERAL: alert, no distress and very thin  NECK: no adenopathy, no asymmetry, masses, head is in a \"head forward\" position, and the neck muscles in the posterior neck are tense/firm all over; I do not feel one \"knot\" but the muscle structure is tight even after palpation  SKIN: no suspicious lesions or rashes  PSYCH: mentation appears normal, affect flat, judgement and insight intact and appearance well groomed                "

## 2022-12-14 ENCOUNTER — MYC MEDICAL ADVICE (OUTPATIENT)
Dept: FAMILY MEDICINE | Facility: CLINIC | Age: 57
End: 2022-12-14

## 2022-12-14 DIAGNOSIS — E78.5 HYPERLIPIDEMIA LDL GOAL <130: ICD-10-CM

## 2022-12-14 RX ORDER — OMEGA-3-ACID ETHYL ESTERS 1 G/1
2 CAPSULE, LIQUID FILLED ORAL DAILY
COMMUNITY
Start: 2022-12-14

## 2022-12-14 RX ORDER — ROSUVASTATIN CALCIUM 5 MG/1
15 TABLET, COATED ORAL DAILY
Qty: 270 TABLET | Refills: 4 | Status: SHIPPED | OUTPATIENT
Start: 2022-12-14 | End: 2023-01-27 | Stop reason: DRUGHIGH

## 2022-12-14 RX ORDER — ROSUVASTATIN CALCIUM 10 MG/1
15 TABLET, COATED ORAL AT BEDTIME
Qty: 135 TABLET | Refills: 4 | Status: SHIPPED | OUTPATIENT
Start: 2022-12-14 | End: 2023-01-27 | Stop reason: DRUGHIGH

## 2022-12-14 NOTE — TELEPHONE ENCOUNTER
Dr. Torres- please advise. pt responded to your lab result note, and possibly looking into increasing dosage on statins.    Ariel Huston, BSN RN  [facility]

## 2022-12-15 ENCOUNTER — TRANSFERRED RECORDS (OUTPATIENT)
Dept: HEALTH INFORMATION MANAGEMENT | Facility: CLINIC | Age: 57
End: 2022-12-15

## 2022-12-31 PROBLEM — D31.40 IRIS NEVUS: Status: ACTIVE | Noted: 2022-12-31

## 2022-12-31 PROBLEM — H26.9 CATARACT: Status: ACTIVE | Noted: 2022-12-31

## 2023-01-09 ENCOUNTER — MYC MEDICAL ADVICE (OUTPATIENT)
Dept: FAMILY MEDICINE | Facility: CLINIC | Age: 58
End: 2023-01-09

## 2023-01-09 DIAGNOSIS — M54.2 CHRONIC NECK PAIN: Primary | ICD-10-CM

## 2023-01-09 DIAGNOSIS — G89.29 CHRONIC NECK PAIN: Primary | ICD-10-CM

## 2023-01-09 NOTE — TELEPHONE ENCOUNTER
Team, please fax this to [facility] (pt mentioned their fax number). Thank you.     Philly Wharton MD

## 2023-01-09 NOTE — TELEPHONE ENCOUNTER
Referral faxed per request to: [facility]?  Their fax is [phone].    VERA ReyesN, RN  [facility]

## 2023-01-25 ENCOUNTER — TELEPHONE (OUTPATIENT)
Dept: FAMILY MEDICINE | Facility: CLINIC | Age: 58
End: 2023-01-25
Payer: COMMERCIAL

## 2023-01-25 NOTE — TELEPHONE ENCOUNTER
Called pt and relayed message. Pt verbalized understanding. appt scheduled on 1/27 with Dr. Torres as no Nurse schedule available.    Ariel Hutson, BSN RN  [facility]

## 2023-01-25 NOTE — TELEPHONE ENCOUNTER
Please call Ignacia and help her to set up a nurse-only appointment for 1/27. She knows her left ear is plugged and needs a cleaning. If needed, I can see her as I am in clinic 1/27. If it is not allowed for her to only have the 'nurse only appointment, then double-book her with me and know the reason for this will be an ear wash.    Thanks  She knows to use Debrox between now and Friday.

## 2023-01-27 ENCOUNTER — OFFICE VISIT (OUTPATIENT)
Dept: FAMILY MEDICINE | Facility: CLINIC | Age: 58
End: 2023-01-27
Payer: COMMERCIAL

## 2023-01-27 VITALS
TEMPERATURE: 97.5 F | OXYGEN SATURATION: 99 % | SYSTOLIC BLOOD PRESSURE: 102 MMHG | RESPIRATION RATE: 16 BRPM | HEIGHT: 64 IN | BODY MASS INDEX: 17.2 KG/M2 | HEART RATE: 55 BPM | WEIGHT: 100.75 LBS | DIASTOLIC BLOOD PRESSURE: 74 MMHG

## 2023-01-27 DIAGNOSIS — E78.5 HYPERLIPIDEMIA LDL GOAL <130: Primary | ICD-10-CM

## 2023-01-27 DIAGNOSIS — H61.23 BILATERAL IMPACTED CERUMEN: ICD-10-CM

## 2023-01-27 PROCEDURE — 99213 OFFICE O/P EST LOW 20 MIN: CPT | Mod: 25 | Performed by: FAMILY MEDICINE

## 2023-01-27 PROCEDURE — 69209 REMOVE IMPACTED EAR WAX UNI: CPT | Mod: 50 | Performed by: FAMILY MEDICINE

## 2023-01-27 RX ORDER — DOXYCYCLINE HYCLATE 100 MG
TABLET ORAL
COMMUNITY
Start: 2023-01-02 | End: 2024-06-21

## 2023-01-27 RX ORDER — ROSUVASTATIN CALCIUM 20 MG/1
20 TABLET, COATED ORAL DAILY
Qty: 90 TABLET | Refills: 4 | Status: SHIPPED | OUTPATIENT
Start: 2023-01-27

## 2023-01-27 RX ORDER — CARISOPRODOL 350 MG/1
1 TABLET ORAL 3 TIMES DAILY PRN
COMMUNITY
Start: 2022-11-21

## 2023-01-27 NOTE — PROGRESS NOTES
"  Assessment & Plan     Hyperlipidemia LDL goal <130  For ease of taking med, she'll now take one 20mg tab daily  - rosuvastatin (CRESTOR) 20 MG tablet  Dispense: 90 tablet; Refill: 4    Bilateral impacted cerumen  Ears washed; she'll use debrox for a week every 2 months to prevent this build up from happening  - CA REMOVAL IMPACTED CERUMEN IRRIGATION/LVG UNILAT      Review of external notes as documented elsewhere in note  Prescription drug management  25 minutes spent on the date of the encounter doing chart review, history and exam, documentation and further activities per the note    Return in about 6 months (around 7/27/2023) for Routine preventive.    Ritika Torres MD  [facility] YUNG Freeman is a 57 year old accompanied by her mother, Aminta presenting for the following health issues:  Ear Lavage and Medication Question (Patient is wondering if she can go to 20mg rosuvastatin because her current pills are so tiny she's not sure she gets them all. )      History of Present Illness       Reason for visit:  Ear  Symptom onset:  3-7 days ago  Symptoms include:  Plugged  Symptom intensity:  Mild  Symptom progression:  Staying the same  Had these symptoms before:  No  What makes it worse:  No  What makes it better:  No    She eats 2-3 servings of fruits and vegetables daily.She consumes 0 sweetened beverage(s) daily.She exercises with enough effort to increase her heart rate 30 to 60 minutes per day.  She exercises with enough effort to increase her heart rate 4 days per week.   She is taking medications regularly.     Cannot hear well - thinks both ears are plugged by wax. She's had this before but not for many years. Would like her ears washed out.    Has some trouble with the pills for Crestor - would like to just take 1 x 20mg tab    Review of Systems         Objective    /74   Pulse 55   Temp 97.5  F (36.4  C) (Oral)   Resp 16   Ht 1.626 m (5' 4\")   Wt 45.7 " kg (100 lb 12 oz)   SpO2 99%   BMI 17.29 kg/m    Body mass index is 17.29 kg/m .  Physical Exam   GENERAL: alert, no distress and frail  HENT: both ear canals full of shiny soft-cerumen  PSYCH: mentation appears normal, affect flat, judgement and insight intact and appearance well groomed    Clinical assistant performed the ear washes

## 2023-05-19 DIAGNOSIS — M25.50 POLYARTHRALGIA: Primary | ICD-10-CM

## 2023-05-19 RX ORDER — CELECOXIB 100 MG/1
100 CAPSULE ORAL 2 TIMES DAILY
Qty: 60 CAPSULE | Refills: 11 | Status: SHIPPED | OUTPATIENT
Start: 2023-05-19

## 2023-05-23 ENCOUNTER — TELEPHONE (OUTPATIENT)
Dept: MAMMOGRAPHY | Facility: CLINIC | Age: 58
End: 2023-05-23
Payer: COMMERCIAL

## 2023-05-23 NOTE — TELEPHONE ENCOUNTER
Patient Quality Outreach    Patient is due for the following:   Breast Cancer Screening - Mammogram    Next Steps:   Schedule a office visit for mammo    Type of outreach:    Phone, left message for patient/parent to call back.   4th call    Next Steps:  Reach out within 90 days via Phone.    Max number of attempts reached: Yes. Will try again in 90 days if patient still on fail list.    Questions for provider review:    None           JULIO Leblanc

## 2023-05-26 ENCOUNTER — OFFICE VISIT (OUTPATIENT)
Dept: PLASTIC SURGERY | Facility: CLINIC | Age: 58
End: 2023-05-26
Payer: COMMERCIAL

## 2023-05-26 DIAGNOSIS — Z41.1 ENCOUNTER FOR COSMETIC PROCEDURE: Primary | ICD-10-CM

## 2023-05-26 NOTE — PROGRESS NOTES
Facial Plastic and Reconstructive Surgery Consultation    Referring Provider:   Referred Self, MD  No address on file    HPI:   I had the pleasure of seeing Lydia Alonzo today in clinic for consultation for brow lift and facial rejuvenation consultation.    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 She previously had nasal surgery with Dr. Lopez. She notes her brows have gotten heavy over the last several years and that the lines in her forehead have become more noticeable. She feels a heavyness over her brow.    She has had botox in her neck due to neck strain and states that since her botox that she used to get to the brow and glabella are less effective. Xeomin has been tried and still limited effect.         Review Of Systems  ROS: 10 point ROS neg other than the symptoms noted above in the HPI.    Patient Active Problem List   Diagnosis     Anxiety disorder     Depression     Essential tremor     Hyperlipidemia     Current severe episode of major depressive disorder without psychotic features without prior episode (H)     Osteopenia     Diverticular disease of large intestine     Other specified abnormal findings of blood chemistry     Ovarian mass     Abdominal distension, gaseous     Iris nevus     Cataract     Past Surgical History:   Procedure Laterality Date     BILATERAL SALPINGOOPHORECTOMY Bilateral 10/2021     Current Outpatient Medications   Medication Sig Dispense Refill     carisoprodol (SOMA) 350 MG tablet Take 1 tablet by mouth 3 times daily as needed       celecoxib (CELEBREX) 100 MG capsule Take 1 capsule (100 mg) by mouth 2 times daily 60 capsule 11     diclofenac (VOLTAREN) 1 % topical gel Apply 4 g topically 4 times daily To knees, ankles, hands and elbows 200 g 4     doxycycline hyclate (VIBRA-TABS) 100 MG tablet        DULoxetine (CYMBALTA) 30 MG capsule Take 1 capsule (30 mg) by mouth 2 times  daily 90 capsule 4     linaclotide (LINZESS) 145 MCG capsule Take 1 capsule by mouth every 24 hours       omega-3 acid ethyl esters (LOVAZA) 1 g capsule Take 2 capsules (2 g) by mouth daily       progesterone (PROMETRIUM) 100 MG capsule [PROGESTERONE (PROMETRIUM) 100 MG CAPSULE] Take 100 mg by mouth.       rosuvastatin (CRESTOR) 20 MG tablet Take 1 tablet (20 mg) by mouth daily 90 tablet 4     valACYclovir (VALTREX) 1000 mg tablet        Neomycin-bacitracin zn-polymyx  Social History     Socioeconomic History     Marital status: Single     Spouse name: Not on file     Number of children: 0     Years of education: 20+     Highest education level: Not on file   Occupational History     Not on file   Tobacco Use     Smoking status: Never     Smokeless tobacco: Never   Vaping Use     Vaping status: Not on file   Substance and Sexual Activity     Alcohol use: No     Drug use: No     Sexual activity: Never     Birth control/protection: None   Other Topics Concern     Not on file   Social History Narrative    6/2022            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[employer]   [phone]            Lives in an apartment; her mom lives in the same complex; brother is in [place], [place]            Buddhist: Anabaptist            Exercise  - weights 3x/week + cardio 2x/week     Social Determinants of Health     Financial Resource Strain: Not on file   Food Insecurity: Not on file   Transportation Needs: Not on file   Physical Activity: Not on file   Stress: Not on file   Social Connections: Not on file   Intimate Partner Violence: Not on file   Housing Stability: Not on file     Family History   Problem Relation Age of Onset     No Known Problems Brother        PE:  Alert and Oriented, Answering Questions Appropriately  Atraumatic, Normocephalic, Face Symmetric  Skin: Mckin[place] 2  Negative vector, lower lid off the lower limbus bilaterally  pseudofat herniation, but  really it is a loss of the midface volume with the deficient maxilla and facial skeleton  Tear trough deformities bilaterally  Bilateral brow ptosis with constant activation of the frontalis bilaterally    IMPRESSION:  Brow ptosis bilateral  Negative vector  Tear trough deformity        PLAN:    Discussed pretrichial bilateral brow lift, quote given, specific risks and complications discussed, post operative time course discussed, all questions answered    Discussed restylane to tear troughs and voluma to midface. Discussed specific risks to filler and  this area. Discussed reported risk of blindness with tear trough filler.       Photodocumentation was obtained.

## 2023-05-26 NOTE — LETTER
5/26/2023       RE: Lydia Alonzo  [address]  [place] [address]         Dear Colleague,    Thank you for referring your patient, Lydia Alonzo, to the [facility] at [facility]. Please see a copy of my visit note below.    Facial Plastic and Reconstructive Surgery Consultation    Referring Provider:   Referred Self, MD  No address on file    HPI:   I had the pleasure of seeing Lydia Alonzo today in clinic for consultation for brow lift and facial rejuvenation consultation.    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 She previously had nasal surgery with Dr. Lopez. She notes her brows have gotten heavy over the last several years and that the lines in her forehead have become more noticeable. She feels a heavyness over her brow.    She has had botox in her neck due to neck strain and states that since her botox that she used to get to the brow and glabella are less effective. Xeomin has been tried and still limited effect.         Review Of Systems  ROS: 10 point ROS neg other than the symptoms noted above in the HPI.    Patient Active Problem List   Diagnosis    Anxiety disorder    Depression    Essential tremor    Hyperlipidemia    Current severe episode of major depressive disorder without psychotic features without prior episode (H)    Osteopenia    Diverticular disease of large intestine    Other specified abnormal findings of blood chemistry    Ovarian mass    Abdominal distension, gaseous    Iris nevus    Cataract     Past Surgical History:   Procedure Laterality Date    BILATERAL SALPINGOOPHORECTOMY Bilateral 10/2021     Current Outpatient Medications   Medication Sig Dispense Refill    carisoprodol (SOMA) 350 MG tablet Take 1 tablet by mouth 3 times daily as needed      celecoxib (CELEBREX) 100 MG capsule  Take 1 capsule (100 mg) by mouth 2 times daily 60 capsule 11    diclofenac (VOLTAREN) 1 % topical gel Apply 4 g topically 4 times daily To knees, ankles, hands and elbows 200 g 4    doxycycline hyclate (VIBRA-TABS) 100 MG tablet       DULoxetine (CYMBALTA) 30 MG capsule Take 1 capsule (30 mg) by mouth 2 times daily 90 capsule 4    linaclotide (LINZESS) 145 MCG capsule Take 1 capsule by mouth every 24 hours      omega-3 acid ethyl esters (LOVAZA) 1 g capsule Take 2 capsules (2 g) by mouth daily      progesterone (PROMETRIUM) 100 MG capsule [PROGESTERONE (PROMETRIUM) 100 MG CAPSULE] Take 100 mg by mouth.      rosuvastatin (CRESTOR) 20 MG tablet Take 1 tablet (20 mg) by mouth daily 90 tablet 4    valACYclovir (VALTREX) 1000 mg tablet        Neomycin-bacitracin zn-polymyx  Social History     Socioeconomic History    Marital status: Single     Spouse name: Not on file    Number of children: 0    Years of education: 20+    Highest education level: Not on file   Occupational History    Not on file   Tobacco Use    Smoking status: Never    Smokeless tobacco: Never   Vaping Use    Vaping status: Not on file   Substance and Sexual Activity    Alcohol use: No    Drug use: No    Sexual activity: Never     Birth control/protection: None   Other Topics Concern    Not on file   Social History Narrative    6/2022            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[employer]   [phone]            Lives in an apartment; her mom lives in the same complex; brother is in [place], [place]            Hindu: Latter-day            Exercise  - weights 3x/week + cardio 2x/week     Social Determinants of Health     Financial Resource Strain: Not on file   Food Insecurity: Not on file   Transportation Needs: Not on file   Physical Activity: Not on file   Stress: Not on file   Social Connections: Not on file   Intimate Partner Violence: Not on file   Housing Stability: Not on file      Family History   Problem Relation Age of Onset    No Known Problems Brother        PE:  Alert and Oriented, Answering Questions Appropriately  Atraumatic, Normocephalic, Face Symmetric  Skin: Mckinley 2  Negative vector, lower lid off the lower limbus bilaterally  pseudofat herniation, but really it is a loss of the midface volume with the deficient maxilla and facial skeleton  Tear trough deformities bilaterally  Bilateral brow ptosis with constant activation of the frontalis bilaterally    IMPRESSION:  Brow ptosis bilateral  Negative vector  Tear trough deformity        PLAN:    Discussed pretrichial bilateral brow lift, quote given, specific risks and complications discussed, post operative time course discussed, all questions answered    Discussed restylane to tear troughs and voluma to midface. Discussed specific risks to filler and  this area. Discussed reported risk of blindness with tear trough filler.       Photodocumentation was obtained.                 Again, thank you for allowing me to participate in the care of your patient.      Sincerely,    Joann Peterson MD

## 2023-05-26 NOTE — LETTER
May 26, 2023  Re: Lydia Alonzo  1965      Dear Dr. Estrella,    Thank you so much for referring Lydia Alonzo to the [facility]. I had the pleasure of visiting with Lydia today.     Attached you will find a copy of my note. Please feel free to reach out to me with any questions, [phone].     Facial Plastic and Reconstructive Surgery Consultation    Referring Provider:   Referred Self, MD  No address on file    HPI:   I had the pleasure of seeing Lydia Alonzo today in clinic for consultation for brow lift and facial rejuvenation consultation.    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 She previously had nasal surgery with Dr. Lopez. She notes her brows have gotten heavy over the last several years and that the lines in her forehead have become more noticeable. She feels a heavyness over her brow.    She has had botox in her neck due to neck strain and states that since her botox that she used to get to the brow and glabella are less effective. Xeomin has been tried and still limited effect.         Review Of Systems  ROS: 10 point ROS neg other than the symptoms noted above in the HPI.    Patient Active Problem List   Diagnosis    Anxiety disorder    Depression    Essential tremor    Hyperlipidemia    Current severe episode of major depressive disorder without psychotic features without prior episode (H)    Osteopenia    Diverticular disease of large intestine    Other specified abnormal findings of blood chemistry    Ovarian mass    Abdominal distension, gaseous    Iris nevus    Cataract     Past Surgical History:   Procedure Laterality Date    BILATERAL SALPINGOOPHORECTOMY Bilateral 10/2021     Current Outpatient Medications   Medication Sig Dispense Refill    carisoprodol (SOMA) 350 MG tablet Take 1 tablet by mouth 3 times daily as needed      celecoxib (CELEBREX) 100 MG capsule Take 1  capsule (100 mg) by mouth 2 times daily 60 capsule 11    diclofenac (VOLTAREN) 1 % topical gel Apply 4 g topically 4 times daily To knees, ankles, hands and elbows 200 g 4    doxycycline hyclate (VIBRA-TABS) 100 MG tablet       DULoxetine (CYMBALTA) 30 MG capsule Take 1 capsule (30 mg) by mouth 2 times daily 90 capsule 4    linaclotide (LINZESS) 145 MCG capsule Take 1 capsule by mouth every 24 hours      omega-3 acid ethyl esters (LOVAZA) 1 g capsule Take 2 capsules (2 g) by mouth daily      progesterone (PROMETRIUM) 100 MG capsule [PROGESTERONE (PROMETRIUM) 100 MG CAPSULE] Take 100 mg by mouth.      rosuvastatin (CRESTOR) 20 MG tablet Take 1 tablet (20 mg) by mouth daily 90 tablet 4    valACYclovir (VALTREX) 1000 mg tablet        Neomycin-bacitracin zn-polymyx  Social History     Socioeconomic History    Marital status: Single     Spouse name: Not on file    Number of children: 0    Years of education: 20+    Highest education level: Not on file   Occupational History    Not on file   Tobacco Use    Smoking status: Never    Smokeless tobacco: Never   Vaping Use    Vaping status: Not on file   Substance and Sexual Activity    Alcohol use: No    Drug use: No    Sexual activity: Never     Birth control/protection: None   Other Topics Concern    Not on file   Social History Narrative    6/2022            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[employer] [facility]   [phone]            Lives in an apartment; her mom lives in the same complex; brother is in [place], [place]            Yazidi: Lutheran            Exercise  - weights 3x/week + cardio 2x/week     Social Determinants of Health     Financial Resource Strain: Not on file   Food Insecurity: Not on file   Transportation Needs: Not on file   Physical Activity: Not on file   Stress: Not on file   Social Connections: Not on file   Intimate Partner Violence: Not on file   Housing Stability: Not on file     Family  History   Problem Relation Age of Onset    No Known Problems Brother        PE:  Alert and Oriented, Answering Questions Appropriately  Atraumatic, Normocephalic, Face Symmetric  Skin: Mckinley 2  Negative vector, lower lid off the lower limbus bilaterally  pseudofat herniation, but really it is a loss of the midface volume with the deficient maxilla and facial skeleton  Tear trough deformities bilaterally  Bilateral brow ptosis with constant activation of the frontalis bilaterally    IMPRESSION:  Brow ptosis bilateral  Negative vector  Tear trough deformity        PLAN:    Discussed pretrichial bilateral brow lift, quote given, specific risks and complications discussed, post operative time course discussed, all questions answered    Discussed restylane to tear troughs and voluma to midface. Discussed specific risks to filler and  this area. Discussed reported risk of blindness with tear trough filler.       Photodocumentation was obtained.       Your trust in our practice and care is much appreciated.    Sincerely,    Joann Peterson MD

## 2023-05-26 NOTE — NURSING NOTE
2D/3D photodocumentation obtained.    Gave pt a cosmetic quote for Brow Lift (see Media tab).    Discussed quote in great detail with pt, including the fact that the Anesthesia and Facility fees are an estimate based on time and may be subject to change. We also discussed the fact that the surgeon's fee is due three weeks prior to surgery.    We discussed the price of 1mL Voluma and 1mL Restylane.    Pt given educational materials on Brow Lift, as well as a list of medications to avoid prior surgery and what to expect post-op.    Pt will call when/if ready to schedule.    Radha Valle RN  5/26/2023 1:33 PM

## 2023-06-24 ENCOUNTER — HEALTH MAINTENANCE LETTER (OUTPATIENT)
Age: 58
End: 2023-06-24

## 2023-08-04 ENCOUNTER — TELEPHONE (OUTPATIENT)
Dept: FAMILY MEDICINE | Facility: CLINIC | Age: 58
End: 2023-08-04
Payer: COMMERCIAL

## 2023-08-04 NOTE — TELEPHONE ENCOUNTER
Patient Quality Outreach    Patient is due for the following:   Cervical Cancer Screening - PAP Needed  Physical Preventive Adult Physical    Next Steps:   Patient has upcoming appointment, these items will be addressed at that time.    Type of outreach:    Chart review performed, no outreach needed.      Questions for provider review:    None           Servando Box  Chart routed to Care Team.

## 2023-09-22 ENCOUNTER — TRANSFERRED RECORDS (OUTPATIENT)
Dept: HEALTH INFORMATION MANAGEMENT | Facility: CLINIC | Age: 58
End: 2023-09-22

## 2023-10-09 ENCOUNTER — MYC MEDICAL ADVICE (OUTPATIENT)
Dept: FAMILY MEDICINE | Facility: CLINIC | Age: 58
End: 2023-10-09
Payer: COMMERCIAL

## 2023-10-11 ENCOUNTER — TELEPHONE (OUTPATIENT)
Dept: MAMMOGRAPHY | Facility: CLINIC | Age: 58
End: 2023-10-11
Payer: COMMERCIAL

## 2023-10-11 NOTE — TELEPHONE ENCOUNTER
Patient Quality Outreach    Patient is due for the following:   Breast Cancer Screening - Mammogram    Next Steps:   Schedule a office visit for mammo    Type of outreach:    Phone, left message for patient/parent to call back.5th call    Next Steps:  Reach out within 90 days via Phone.    Max number of attempts reached: Yes. Will try again in 90 days if patient still on fail list.    Questions for provider review:    None           JULIO Leblanc  Chart routed to Care Team.

## 2023-10-12 ENCOUNTER — TELEPHONE (OUTPATIENT)
Dept: FAMILY MEDICINE | Facility: CLINIC | Age: 58
End: 2023-10-12
Payer: COMMERCIAL

## 2023-10-12 DIAGNOSIS — M25.551 HIP PAIN, RIGHT: Primary | ICD-10-CM

## 2023-10-12 NOTE — TELEPHONE ENCOUNTER
Referral faxed per provider requested below.    VERA ReyesN, RN  [facility]      Please print out the PT referral written 10/12/23 for [facility] and fax it to them (fax # is on the order in the comments section)     thanks

## 2023-10-12 NOTE — TELEPHONE ENCOUNTER
Please print out the PT referral written 10/12/23 for [facility] and fax it to them (fax # is on the order in the comments section)    thanks

## 2023-11-15 ENCOUNTER — TRANSFERRED RECORDS (OUTPATIENT)
Dept: HEALTH INFORMATION MANAGEMENT | Facility: CLINIC | Age: 58
End: 2023-11-15
Payer: COMMERCIAL

## 2023-12-12 ENCOUNTER — TRANSFERRED RECORDS (OUTPATIENT)
Dept: HEALTH INFORMATION MANAGEMENT | Facility: CLINIC | Age: 58
End: 2023-12-12
Payer: COMMERCIAL

## 2023-12-12 DIAGNOSIS — M25.542 JOINT PAIN IN FINGERS OF BOTH HANDS: Primary | ICD-10-CM

## 2023-12-12 DIAGNOSIS — M25.541 JOINT PAIN IN FINGERS OF BOTH HANDS: Primary | ICD-10-CM

## 2024-02-11 DIAGNOSIS — M54.2 NECK PAIN: ICD-10-CM

## 2024-02-11 DIAGNOSIS — F32.2 CURRENT SEVERE EPISODE OF MAJOR DEPRESSIVE DISORDER WITHOUT PSYCHOTIC FEATURES WITHOUT PRIOR EPISODE (H): ICD-10-CM

## 2024-02-12 RX ORDER — DULOXETIN HYDROCHLORIDE 30 MG/1
CAPSULE, DELAYED RELEASE ORAL
Qty: 90 CAPSULE | Refills: 4 | Status: SHIPPED | OUTPATIENT
Start: 2024-02-12 | End: 2024-05-21

## 2024-05-21 ENCOUNTER — OFFICE VISIT (OUTPATIENT)
Dept: FAMILY MEDICINE | Facility: CLINIC | Age: 59
End: 2024-05-21
Payer: COMMERCIAL

## 2024-05-21 ENCOUNTER — MYC MEDICAL ADVICE (OUTPATIENT)
Dept: FAMILY MEDICINE | Facility: CLINIC | Age: 59
End: 2024-05-21

## 2024-05-21 VITALS
OXYGEN SATURATION: 100 % | SYSTOLIC BLOOD PRESSURE: 110 MMHG | WEIGHT: 107.08 LBS | TEMPERATURE: 97.9 F | RESPIRATION RATE: 12 BRPM | HEART RATE: 60 BPM | HEIGHT: 64 IN | BODY MASS INDEX: 18.28 KG/M2 | DIASTOLIC BLOOD PRESSURE: 71 MMHG

## 2024-05-21 DIAGNOSIS — F51.01 PRIMARY INSOMNIA: ICD-10-CM

## 2024-05-21 DIAGNOSIS — M54.2 NECK PAIN: Primary | ICD-10-CM

## 2024-05-21 DIAGNOSIS — Z00.00 PREVENTATIVE HEALTH CARE: ICD-10-CM

## 2024-05-21 DIAGNOSIS — F32.0 CURRENT MILD EPISODE OF MAJOR DEPRESSIVE DISORDER, UNSPECIFIED WHETHER RECURRENT (H): ICD-10-CM

## 2024-05-21 DIAGNOSIS — E78.2 MIXED HYPERLIPIDEMIA: ICD-10-CM

## 2024-05-21 LAB
ANION GAP SERPL CALCULATED.3IONS-SCNC: 9 MMOL/L (ref 7–15)
APO A-I SERPL-MCNC: 9 MG/DL
BASOPHILS # BLD AUTO: 0.1 10E3/UL (ref 0–0.2)
BASOPHILS NFR BLD AUTO: 1 %
BUN SERPL-MCNC: 17.7 MG/DL (ref 6–20)
CALCIUM SERPL-MCNC: 9.3 MG/DL (ref 8.6–10)
CHLORIDE SERPL-SCNC: 96 MMOL/L (ref 98–107)
CHOLEST SERPL-MCNC: 228 MG/DL
CREAT SERPL-MCNC: 0.73 MG/DL (ref 0.51–0.95)
DEPRECATED HCO3 PLAS-SCNC: 24 MMOL/L (ref 22–29)
EGFRCR SERPLBLD CKD-EPI 2021: >90 ML/MIN/1.73M2
EOSINOPHIL # BLD AUTO: 0.8 10E3/UL (ref 0–0.7)
EOSINOPHIL NFR BLD AUTO: 16 %
ERYTHROCYTE [DISTWIDTH] IN BLOOD BY AUTOMATED COUNT: 14.4 % (ref 10–15)
FASTING STATUS PATIENT QL REPORTED: YES
FASTING STATUS PATIENT QL REPORTED: YES
GLUCOSE SERPL-MCNC: 101 MG/DL (ref 70–99)
HCT VFR BLD AUTO: 36 % (ref 35–47)
HDLC SERPL-MCNC: 117 MG/DL
HGB BLD-MCNC: 12.6 G/DL (ref 11.7–15.7)
IMM GRANULOCYTES # BLD: 0 10E3/UL
IMM GRANULOCYTES NFR BLD: 0 %
LDLC SERPL CALC-MCNC: 102 MG/DL
LYMPHOCYTES # BLD AUTO: 1.2 10E3/UL (ref 0.8–5.3)
LYMPHOCYTES NFR BLD AUTO: 26 %
MCH RBC QN AUTO: 33.8 PG (ref 26.5–33)
MCHC RBC AUTO-ENTMCNC: 35 G/DL (ref 31.5–36.5)
MCV RBC AUTO: 97 FL (ref 78–100)
MONOCYTES # BLD AUTO: 0.5 10E3/UL (ref 0–1.3)
MONOCYTES NFR BLD AUTO: 11 %
NEUTROPHILS # BLD AUTO: 2.1 10E3/UL (ref 1.6–8.3)
NEUTROPHILS NFR BLD AUTO: 46 %
NONHDLC SERPL-MCNC: 111 MG/DL
PLATELET # BLD AUTO: 232 10E3/UL (ref 150–450)
POTASSIUM SERPL-SCNC: 4.7 MMOL/L (ref 3.4–5.3)
RBC # BLD AUTO: 3.73 10E6/UL (ref 3.8–5.2)
SODIUM SERPL-SCNC: 129 MMOL/L (ref 135–145)
TRIGL SERPL-MCNC: 47 MG/DL
WBC # BLD AUTO: 4.7 10E3/UL (ref 4–11)

## 2024-05-21 PROCEDURE — 85025 COMPLETE CBC W/AUTO DIFF WBC: CPT | Performed by: FAMILY MEDICINE

## 2024-05-21 PROCEDURE — 83695 ASSAY OF LIPOPROTEIN(A): CPT | Performed by: FAMILY MEDICINE

## 2024-05-21 PROCEDURE — 80048 BASIC METABOLIC PNL TOTAL CA: CPT | Performed by: FAMILY MEDICINE

## 2024-05-21 PROCEDURE — 80061 LIPID PANEL: CPT | Performed by: FAMILY MEDICINE

## 2024-05-21 PROCEDURE — 99214 OFFICE O/P EST MOD 30 MIN: CPT | Performed by: FAMILY MEDICINE

## 2024-05-21 PROCEDURE — G2211 COMPLEX E/M VISIT ADD ON: HCPCS | Performed by: FAMILY MEDICINE

## 2024-05-21 PROCEDURE — 36415 COLL VENOUS BLD VENIPUNCTURE: CPT | Performed by: FAMILY MEDICINE

## 2024-05-21 RX ORDER — NORTRIPTYLINE HCL 10 MG
10 CAPSULE ORAL AT BEDTIME
Qty: 90 CAPSULE | Refills: 4 | Status: SHIPPED | OUTPATIENT
Start: 2024-05-21

## 2024-05-21 RX ORDER — DULOXETIN HYDROCHLORIDE 30 MG/1
CAPSULE, DELAYED RELEASE ORAL
Qty: 360 CAPSULE | Refills: 4 | Status: SHIPPED | OUTPATIENT
Start: 2024-05-21

## 2024-05-21 ASSESSMENT — PATIENT HEALTH QUESTIONNAIRE - PHQ9
SUM OF ALL RESPONSES TO PHQ QUESTIONS 1-9: 6
10. IF YOU CHECKED OFF ANY PROBLEMS, HOW DIFFICULT HAVE THESE PROBLEMS MADE IT FOR YOU TO DO YOUR WORK, TAKE CARE OF THINGS AT HOME, OR GET ALONG WITH OTHER PEOPLE: SOMEWHAT DIFFICULT
SUM OF ALL RESPONSES TO PHQ QUESTIONS 1-9: 6

## 2024-05-21 NOTE — TELEPHONE ENCOUNTER
Form requested from patient [employer] message dated 5/21/24 printed, partially completed and placed in provider basket at  to be signed.    Logan Gresham RN  [facility]

## 2024-05-21 NOTE — PROGRESS NOTES
Assessment & Plan     Neck pain  She has cervical spine arthritis and had muscle tension; has maximized PT to help and now is looking for medications to help. As of today, we'll increase her duloxetine from 60/day to 120/day (regimen per sig on prescription), AND we'll add nortriptyline 10mg to help with chronic pain and slep.  - DULoxetine (CYMBALTA) 30 MG capsule  Dispense: 360 capsule; Refill: 4  - nortriptyline (PAMELOR) 10 MG capsule  Dispense: 90 capsule; Refill: 4    Mixed hyperlipidemia  Check today, discuss at upcoming physical  - Lipid panel reflex to direct LDL Non-fasting  - Lipoprotein (a)  - Lipid panel reflex to direct LDL Non-fasting  - Lipoprotein (a)    Primary insomnia  This is partly due to pain and partly due to stress. Try nortriptyline 10mg and take 12 hrs prior to when you want to wake. Should also help pain.  - nortriptyline (PAMELOR) 10 MG capsule  Dispense: 90 capsule; Refill: 4    Current mild episode of major depressive disorder, unspecified whether recurrent (H24)  Mood is good at this time. Continue med for coping with chronic pain  - DULoxetine (CYMBALTA) 30 MG capsule  Dispense: 360 capsule; Refill: 4  - nortriptyline (PAMELOR) 10 MG capsule  Dispense: 90 capsule; Refill: 4    Preventative health care  Labs done today to be reviewed at upcoming physical  - REVIEW OF HEALTH MAINTENANCE PROTOCOL ORDERS  - CBC with platelets and differential  - Basic metabolic panel  (Ca, Cl, CO2, Creat, Gluc, K, Na, BUN)  - CBC with platelets and differential  - Basic metabolic panel  (Ca, Cl, CO2, Creat, Gluc, K, Na, BUN)                        Subjective   Lydia is a 58 year old, presenting for the following health issues:  Neck Pain      5/21/2024     6:57 AM   Additional Questions   Roomed by THAI DONALD MA   Accompanied by SELF     History of Present Illness       Reason for visit:  Arthritis    She eats 2-3 servings of fruits and vegetables daily.She consumes 0 sweetened beverage(s) daily.She  "exercises with enough effort to increase her heart rate 30 to 60 minutes per day.  She exercises with enough effort to increase her heart rate 6 days per week.   She is taking medications regularly.     NECK pain -   Has been doing PT for a very long time. The PT says they've done all that they can. They helped with posture, general exercises, etc.  Work accomodations have been made.    Spine care at [facility] = said Ignacia had tried everything    Doing well otherwise.        Objective    /71   Pulse 60   Temp 97.9  F (36.6  C) (Oral)   Resp 12   Ht 1.626 m (5' 4\")   Wt 48.6 kg (107 lb 1.3 oz)   SpO2 100%   BMI 18.38 kg/m    Body mass index is 18.38 kg/m .  Physical Exam   GENERAL: alert and no distress  NECK: no asymmetry, masses, or scars and paraspinal muscles and trapezius muscles are normal in tone  MS: normal muscle tone and kyphosis noted  PSYCH: mentation appears normal, affect normal/bright    Results for orders placed or performed in visit on 05/21/24 (from the past 24 hour(s))   CBC with platelets and differential    Narrative    The following orders were created for panel order CBC with platelets and differential.  Procedure                               Abnormality         Status                     ---------                               -----------         ------                     CBC with platelets and d...[[id number]]                      In process                   Please view results for these tests on the individual orders.           Signed Electronically by: Ritika Torres MD    "

## 2024-05-22 ENCOUNTER — TELEPHONE (OUTPATIENT)
Dept: FAMILY MEDICINE | Facility: CLINIC | Age: 59
End: 2024-05-22
Payer: COMMERCIAL

## 2024-05-22 PROBLEM — G89.29 OTHER CHRONIC PAIN: Status: ACTIVE | Noted: 2024-05-22

## 2024-05-22 NOTE — TELEPHONE ENCOUNTER
Prior Authorization Retail Medication Request    Medication/Dose:   DULoxetine (CYMBALTA) 30 MG capsule   Diagnosis and ICD code (if different than what is on RX):   Neck pain [M54.2]  - Primary      Current mild episode of major depressive disorder, unspecified whether recurrent (H24) [F32.0]      New/renewal/insurance change PA/secondary ins. PA: Renewal   Previously Tried and Failed:  Unknown   Rationale:  Unknown     Insurance   Primary: [employer] [place]   Insurance ID:  [id number]

## 2024-06-03 NOTE — TELEPHONE ENCOUNTER
Central Prior Authorization Team   Phone: [phone]    PA Initiation    Medication: DULoxetine HCl 30MG dr capsules  Insurance Company: [employer] [place] - Phone [phone] Fax [phone]  Pharmacy Filling the Rx: [id number] - [place], [place] - [address]  Filling Pharmacy Phone: [phone]  Filling Pharmacy Fax:    Start Date: 6/3/2024

## 2024-06-06 NOTE — TELEPHONE ENCOUNTER
Prior Authorization Approval    Authorization Effective Date: 5/7/2024  Authorization Expiration Date: 6/6/2025  Medication: DULoxetine HCl 30MG dr capsules  Reference #:     Insurance Company: [facility] - Phone [phone] Fax [phone]  Which Pharmacy is filling the prescription (Not needed for infusion/clinic administered): [id number] - [place], [place] - [address]  Pharmacy Notified: Yes  Patient Notified: Instructed pharmacy to notify patient when script is ready to /ship.

## 2024-06-17 SDOH — HEALTH STABILITY: PHYSICAL HEALTH: ON AVERAGE, HOW MANY MINUTES DO YOU ENGAGE IN EXERCISE AT THIS LEVEL?: 40 MIN

## 2024-06-17 SDOH — HEALTH STABILITY: PHYSICAL HEALTH: ON AVERAGE, HOW MANY DAYS PER WEEK DO YOU ENGAGE IN MODERATE TO STRENUOUS EXERCISE (LIKE A BRISK WALK)?: 5 DAYS

## 2024-06-17 ASSESSMENT — SOCIAL DETERMINANTS OF HEALTH (SDOH): HOW OFTEN DO YOU GET TOGETHER WITH FRIENDS OR RELATIVES?: MORE THAN THREE TIMES A WEEK

## 2024-06-21 ENCOUNTER — OFFICE VISIT (OUTPATIENT)
Dept: FAMILY MEDICINE | Facility: CLINIC | Age: 59
End: 2024-06-21
Payer: COMMERCIAL

## 2024-06-21 VITALS
WEIGHT: 105.13 LBS | RESPIRATION RATE: 12 BRPM | HEART RATE: 72 BPM | HEIGHT: 64 IN | TEMPERATURE: 97.6 F | SYSTOLIC BLOOD PRESSURE: 96 MMHG | BODY MASS INDEX: 17.95 KG/M2 | DIASTOLIC BLOOD PRESSURE: 68 MMHG | OXYGEN SATURATION: 99 %

## 2024-06-21 DIAGNOSIS — G89.29 OTHER CHRONIC PAIN: ICD-10-CM

## 2024-06-21 DIAGNOSIS — Z00.00 PREVENTATIVE HEALTH CARE: Primary | ICD-10-CM

## 2024-06-21 DIAGNOSIS — G25.0 ESSENTIAL TREMOR: ICD-10-CM

## 2024-06-21 DIAGNOSIS — Z63.79 STRESS DUE TO ILLNESS OF FAMILY MEMBER: ICD-10-CM

## 2024-06-21 DIAGNOSIS — R63.6 UNDERWEIGHT: ICD-10-CM

## 2024-06-21 PROBLEM — R79.89 OTHER SPECIFIED ABNORMAL FINDINGS OF BLOOD CHEMISTRY: Status: RESOLVED | Noted: 2017-12-05 | Resolved: 2024-06-21

## 2024-06-21 PROBLEM — H26.9 CATARACT: Status: RESOLVED | Noted: 2022-12-31 | Resolved: 2024-06-21

## 2024-06-21 PROBLEM — E78.00 PURE HYPERCHOLESTEROLEMIA: Status: ACTIVE | Noted: 2017-11-15

## 2024-06-21 PROBLEM — N83.8 OVARIAN MASS: Status: RESOLVED | Noted: 2021-10-06 | Resolved: 2024-06-21

## 2024-06-21 PROCEDURE — 99396 PREV VISIT EST AGE 40-64: CPT | Performed by: FAMILY MEDICINE

## 2024-06-21 ASSESSMENT — PATIENT HEALTH QUESTIONNAIRE - PHQ9
SUM OF ALL RESPONSES TO PHQ QUESTIONS 1-9: 1
SUM OF ALL RESPONSES TO PHQ QUESTIONS 1-9: 1
10. IF YOU CHECKED OFF ANY PROBLEMS, HOW DIFFICULT HAVE THESE PROBLEMS MADE IT FOR YOU TO DO YOUR WORK, TAKE CARE OF THINGS AT HOME, OR GET ALONG WITH OTHER PEOPLE: NOT DIFFICULT AT ALL

## 2024-06-21 NOTE — PROGRESS NOTES
Preventive Care Visit  [facility] YUNG Torres MD, Family Medicine  Jun 21, 2024      Assessment & Plan     Preventative health care  She is on top of things with her health, and has improved her upper back/neck pain in the past year.    Essential tremor  Noted, no change. Not significant.    Other chronic pain  Upper back and neck has been chronic but she is managing this exceptionally well. She tried acupuncture, but found PT quite helpful. She believes she aligns well at work. I encouraged her to think about massage to help her loosen up even more.    Underweight  She is trying to gain weight and agrees to try to get her BMI over 19.    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 Because her mom is cognitively intact but losing some function and struggling with that, this is emotionally and mentally difficult and weighs on her. They live in the same apartment complex, so that helps.        Counseling  Appropriate preventive services were discussed with this patient, including applicable screening as appropriate for fall prevention, nutrition, physical activity, Tobacco-use cessation, weight loss and cognition.  Checklist reviewing preventive services available has been given to the patient.  Reviewed patient's diet, addressing concerns and/or questions.   She is at risk for psychosocial distress and has been provided with information to reduce risk.                   Subjective   Lydia is a 58 year old, presenting for the following:  Physical        6/21/2024     7:02 AM   Additional Questions   Roomed by JEFFREY Key   Accompanied by Self        Health Care Directive  Patient does not have a Health Care Directive or Living Will: Advance Directive received and scanned. Click on Code in the patient header to view.    HPI  Neck - ok. Has bad days but the PT has helped a lot. Is on her own at this point.  Aligns better at work.    Stress with caring for mom.    Cyst on skin under right buttock.    Had pap with ovaries getting removed.    Wt- trying to gain    Gets calcium, had bone density done recently.    Stress - mgmt    Work - plans to     Up on eyes and teeth          6/17/2024   General Health   How would you rate your overall physical health? Good   Feel stress (tense, anxious, or unable to sleep) Only a little      (!) STRESS CONCERN      6/17/2024   Nutrition   Three or more servings of calcium each day? Yes   Diet: Regular (no restrictions)   How many servings of fruit and vegetables per day? (!) 2-3   How many sweetened beverages each day? 0-1            6/17/2024   Exercise   Days per week of moderate/strenous exercise 5 days   Average minutes spent exercising at this level 40 min            6/17/2024   Social Factors   Frequency of gathering with friends or relatives More than three times a week   Worry food won't last until get money to buy more No   Food not last or not have enough money for food? No   Do you have housing? (Housing is defined as stable permanent housing and does not include staying ouside in a car, in a tent, in an abandoned building, in an overnight shelter, or couch-surfing.) Yes   Are you worried about losing your housing? No   Lack of transportation? No   Unable to get utilities (heat,electricity)? No            6/17/2024   Fall Risk   Fallen 2 or more times in the past year? No   Trouble with walking or balance? No             6/17/2024   Dental   Dentist two times every year? Yes            6/17/2024   TB Screening   Were you born outside of the [place]? No          Today's PHQ-9 Score:       6/21/2024     6:55 AM   PHQ-9 SCORE   PHQ-9 Total Score MyChart 1 (Minimal depression)         6/17/2024   Substance Use   Alcohol more than 3/day or more than 7/wk No   Do you use any other substances recreationally? (!) PRESCRIPTION DRUGS        Social History     Tobacco Use    Smoking status:  "Never     Passive exposure: Never    Smokeless tobacco: Never   Vaping Use    Vaping status: Never Used   Substance Use Topics    Alcohol use: No    Drug use: No                  6/17/2024   STI Screening   New sexual partner(s) since last STI/HIV test? No        History of abnormal Pap smear: No - age 30- 64 PAP with HPV every 5 years recommended        Latest Ref Rng & Units 12/20/2017    10:32 AM   PAP / HPV   PAP  Negative for squamous intraepithelial lesion or malignancy  Electronically signed by Kristy Stokes CT (ASCP) on 12/26/2017 at  2:09 PM      HPV 16 DNA NEG Negative    HPV 18 DNA NEG Negative    Other HR HPV NEG Negative      ASCVD Risk   The ASCVD Risk score (Toño BENITEZ, et al., 2019) failed to calculate for the following reasons:    The valid HDL cholesterol range is 20 to 100 mg/dL         Reviewed and updated as needed this visit by Provider     Meds                   Objective    Exam  BP 96/68 (BP Location: Left arm, Patient Position: Sitting, Cuff Size: Adult Regular)   Pulse 72   Temp 97.6  F (36.4  C) (Oral)   Resp 12   Ht 1.631 m (5' 4.21\")   Wt 47.7 kg (105 lb 2 oz)   SpO2 99%   BMI 17.93 kg/m     Estimated body mass index is 17.93 kg/m  as calculated from the following:    Height as of this encounter: 1.631 m (5' 4.21\").    Weight as of this encounter: 47.7 kg (105 lb 2 oz).    Physical Exam  GENERAL: alert and no distress  NECK: no adenopathy, no asymmetry, masses, or scars  RESP: lungs clear to auscultation - no rales, rhonchi or wheezes  CV: regular rate and rhythm, normal S1 S2, no S3 or S4, no murmur, click or rub, no peripheral edema  ABDOMEN: soft, nontender, no hepatosplenomegaly, no masses and bowel sounds normal  MS: no gross musculoskeletal defects noted, no edema  SKIN: many lesions on her back, none (per me)  PSYCH: mentation appears normal, affect normal/bright    No labs done today  Reviewed labs done last month    Signed Electronically by: Ritika" MD Brian    Answers submitted by the patient for this visit:  Patient Health Questionnaire (Submitted on 6/21/2024)  If you checked off any problems, how difficult have these problems made it for you to do your work, take care of things at home, or get along with other people?: Not difficult at all  PHQ9 TOTAL SCORE: 1

## 2024-07-10 ENCOUNTER — TRANSFERRED RECORDS (OUTPATIENT)
Dept: HEALTH INFORMATION MANAGEMENT | Facility: CLINIC | Age: 59
End: 2024-07-10
Payer: COMMERCIAL

## 2024-07-15 ENCOUNTER — TRANSFERRED RECORDS (OUTPATIENT)
Dept: HEALTH INFORMATION MANAGEMENT | Facility: CLINIC | Age: 59
End: 2024-07-15
Payer: COMMERCIAL

## 2024-09-09 ENCOUNTER — TELEPHONE (OUTPATIENT)
Dept: FAMILY MEDICINE | Facility: CLINIC | Age: 59
End: 2024-09-09
Payer: COMMERCIAL

## 2024-09-09 ENCOUNTER — MYC MEDICAL ADVICE (OUTPATIENT)
Dept: FAMILY MEDICINE | Facility: CLINIC | Age: 59
End: 2024-09-09
Payer: COMMERCIAL

## 2024-09-09 ENCOUNTER — MYC REFILL (OUTPATIENT)
Dept: FAMILY MEDICINE | Facility: CLINIC | Age: 59
End: 2024-09-09
Payer: COMMERCIAL

## 2024-09-09 DIAGNOSIS — E78.5 HYPERLIPIDEMIA LDL GOAL <130: ICD-10-CM

## 2024-09-09 NOTE — TELEPHONE ENCOUNTER
Prior Authorization Retail Medication Request    Medication/Dose: rosuvastatin (CRESTOR) 20 MG tablet   Diagnosis and ICD code (if different than what is on RX):  Hyperlipidemia LDL goal <130 [E78.5] - Primary  New/renewal/insurance change PA/secondary ins. PA: New   Previously Tried and Failed:  N/A  Rationale:  N/A    Insurance   Primary: [employer] [place]   Insurance ID:  [id number]     Secondary (if applicable): N/A  Insurance ID:  N/A    Pharmacy Information (if different than what is on RX)  Name:  [facility], [place] - [address] [place] [address]   Phone:  [phone]  Fax: [phone]        Bienvenido Feliciano, MSN, RN   [facility]

## 2024-09-09 NOTE — TELEPHONE ENCOUNTER
Writer responded to patient message via [employer]     Bienvenido Feliciano, MSN, RN   [facility]

## 2024-09-10 DIAGNOSIS — E78.5 HYPERLIPIDEMIA LDL GOAL <130: Primary | ICD-10-CM

## 2024-09-10 RX ORDER — PRAVASTATIN SODIUM 10 MG
20 TABLET ORAL DAILY
Qty: 60 TABLET | Refills: 5 | Status: SHIPPED | OUTPATIENT
Start: 2024-09-10

## 2024-09-11 RX ORDER — ROSUVASTATIN CALCIUM 20 MG/1
20 TABLET, COATED ORAL DAILY
Qty: 90 TABLET | Refills: 4 | OUTPATIENT
Start: 2024-09-11

## 2024-09-11 NOTE — TELEPHONE ENCOUNTER
Central Prior Authorization Team   Phone: [phone]    PA Initiation    Medication: rosuvastatin (CRESTOR) 20 MG tablet   Insurance Company: [employer] [place] - Phone [phone] Fax [phone]  Pharmacy Filling the Rx: [id number] - [place], [place] - [address]   Filling Pharmacy Phone: [phone]  Filling Pharmacy Fax:    Start Date: 9/11/2024

## 2024-09-12 NOTE — TELEPHONE ENCOUNTER
Prior Authorization Not Needed per Insurance    Medication: rosuvastatin (CRESTOR) 20 MG tablet   Insurance Company: [facility] - Phone [phone] Fax [phone]  Expected CoPay:      Pharmacy Filling the Rx: [id number] - [place], [place] - [address]

## 2024-09-13 ENCOUNTER — TELEPHONE (OUTPATIENT)
Dept: FAMILY MEDICINE | Facility: CLINIC | Age: 59
End: 2024-09-13
Payer: COMMERCIAL

## 2024-09-13 DIAGNOSIS — E78.5 HYPERLIPIDEMIA LDL GOAL <130: Primary | ICD-10-CM

## 2024-09-13 NOTE — TELEPHONE ENCOUNTER
Prior Authorization Retail Medication Request    Medication/Dose: pravastatin (PRAVACHOL) 10 MG tablet   Diagnosis and ICD code (if different than what is on RX):    Hyperlipidemia LDL goal <130 [E78.5]  - Primary      New/renewal/insurance change PA/secondary ins. PA: New    Previously Tried and Failed:  Unknown   Rationale:  Unknown     Insurance   Primary:     [employer] [place]   Insurance ID: [id number]

## 2024-09-17 NOTE — TELEPHONE ENCOUNTER
PA Initiation    Medication: PRAVASTATIN SODIUM 10 MG PO TABS  Insurance Company: [employer] [place] - Phone [phone] Fax [phone]  Pharmacy Filling the Rx: [id number] - [place], [place] - [address]  Filling Pharmacy Phone: [phone]  Filling Pharmacy Fax:    Start Date: 9/17/2024  Retail Pharmacy Prior Authorization Team   Phone: [phone]

## 2024-09-23 NOTE — TELEPHONE ENCOUNTER
PRIOR AUTHORIZATION DENIED           MUST USE 20MG TABLETS  PER DAY QTY LIMIT IS 1 TABLET PER DAY    Medication: PRAVASTATIN SODIUM 10 MG PO TABS  Insurance Company: [employer] [place] - Phone [phone] Fax [phone]  Denial Date: 9/22/2024  Denial Reason(s):     Appeal Information:     Patient Notified: The clinic should notify the patient of the denial.

## 2024-09-24 RX ORDER — PRAVASTATIN SODIUM 20 MG
20 TABLET ORAL DAILY
Qty: 90 TABLET | Refills: 3 | Status: SHIPPED | OUTPATIENT
Start: 2024-09-24

## 2024-10-22 ENCOUNTER — TRANSFERRED RECORDS (OUTPATIENT)
Dept: HEALTH INFORMATION MANAGEMENT | Facility: CLINIC | Age: 59
End: 2024-10-22
Payer: COMMERCIAL

## 2024-10-26 ENCOUNTER — MYC REFILL (OUTPATIENT)
Dept: FAMILY MEDICINE | Facility: CLINIC | Age: 59
End: 2024-10-26
Payer: COMMERCIAL

## 2024-10-26 DIAGNOSIS — M25.50 POLYARTHRALGIA: ICD-10-CM

## 2024-10-28 RX ORDER — CELECOXIB 100 MG/1
100 CAPSULE ORAL 2 TIMES DAILY
Qty: 60 CAPSULE | Refills: 11 | Status: SHIPPED | OUTPATIENT
Start: 2024-10-28

## 2025-05-22 ENCOUNTER — PATIENT OUTREACH (OUTPATIENT)
Dept: CARE COORDINATION | Facility: CLINIC | Age: 60
End: 2025-05-22
Payer: COMMERCIAL

## 2025-06-05 ENCOUNTER — PATIENT OUTREACH (OUTPATIENT)
Dept: CARE COORDINATION | Facility: CLINIC | Age: 60
End: 2025-06-05
Payer: COMMERCIAL

## 2025-08-06 ENCOUNTER — PATIENT OUTREACH (OUTPATIENT)
Dept: CARE COORDINATION | Facility: CLINIC | Age: 60
End: 2025-08-06
Payer: COMMERCIAL

## 2025-08-17 ENCOUNTER — MYC REFILL (OUTPATIENT)
Dept: FAMILY MEDICINE | Facility: CLINIC | Age: 60
End: 2025-08-17
Payer: COMMERCIAL

## 2025-08-17 DIAGNOSIS — F51.01 PRIMARY INSOMNIA: ICD-10-CM

## 2025-08-17 DIAGNOSIS — M54.2 NECK PAIN: ICD-10-CM

## 2025-08-17 DIAGNOSIS — F32.0 CURRENT MILD EPISODE OF MAJOR DEPRESSIVE DISORDER, UNSPECIFIED WHETHER RECURRENT: ICD-10-CM

## 2025-08-18 RX ORDER — NORTRIPTYLINE HYDROCHLORIDE 10 MG/1
10 CAPSULE ORAL AT BEDTIME
Qty: 90 CAPSULE | Refills: 0 | Status: SHIPPED | OUTPATIENT
Start: 2025-08-18